# Patient Record
Sex: FEMALE | Race: OTHER | HISPANIC OR LATINO | Employment: UNEMPLOYED | URBAN - METROPOLITAN AREA
[De-identification: names, ages, dates, MRNs, and addresses within clinical notes are randomized per-mention and may not be internally consistent; named-entity substitution may affect disease eponyms.]

---

## 2022-12-07 ENCOUNTER — NON-PROVIDER VISIT (OUTPATIENT)
Dept: NEUROLOGY | Facility: MEDICAL CENTER | Age: 42
End: 2022-12-07
Attending: PSYCHIATRY & NEUROLOGY
Payer: MEDICAID

## 2022-12-07 DIAGNOSIS — R20.2 PARESTHESIA OF SKIN: ICD-10-CM

## 2022-12-07 PROCEDURE — 95909 NRV CNDJ TST 5-6 STUDIES: CPT | Mod: 26 | Performed by: PSYCHIATRY & NEUROLOGY

## 2022-12-07 PROCEDURE — 95886 MUSC TEST DONE W/N TEST COMP: CPT | Performed by: PSYCHIATRY & NEUROLOGY

## 2022-12-07 PROCEDURE — 95886 MUSC TEST DONE W/N TEST COMP: CPT | Mod: 26 | Performed by: PSYCHIATRY & NEUROLOGY

## 2022-12-07 PROCEDURE — 95909 NRV CNDJ TST 5-6 STUDIES: CPT | Performed by: PSYCHIATRY & NEUROLOGY

## 2022-12-07 NOTE — PROCEDURES
"NERVE CONDUCTION STUDIES AND ELECTROMYOGRAPHY REPORT  [facility]  12/07/22          IMPRESSION:  This is a normal study.  There is no evidence of a length dependent large fiber peripheral neuropathy or left lumbosacral radiculopathy.      Nereyda Simons MD  Neurology - Neurophysiology              REASON FOR REFERRAL:  Ms. Rowan Rockwell 41 y.o. referred by Helen CHRISTIE for an evaluation of sensory disturbance.  Symptoms are described as a warm sensation in the inner thighs with gradual distal spread.  Symptoms were present intermittently since July 2022 but within the last month have become more prominent and persistent.  She does have low back pain.  Symptoms originally started on the left.    Height: 5'0\"  Weight: 118 lbs    Symptom focused neurological exam shows normal strength and sensation to light touch in the bilateral lower extremities.  Patellar and Achilles reflexes are present and symmetric.      ELECTRODIAGNOSTIC EXAMINATION:  Nerve conduction studies (NCS) and electromyography (EMG) are utilized to evaluate direct or indirect damage to the peripheral nervous system. NCS are performed to measure the nerve(s) response(s) to electrostimulation across a given nerve segment. EMG evaluates the passive and active electrical activity of the muscle(s) in question.  Muscles are innervated by specific peripheral nerves and roots. Often times, several nerves the muscle to be examined in order to determine the presence or absence of the disease process. Furthermore, nerves and muscles may need to be tested in a ikth-mg-dphh comparison, as well as in additional extremities, as this may be crucial in characterizing the extent of the disease process, which may be diffuse or isolated and of varying degree of severity. The extent of the neurodiagnostic exam is justified as it may help arrive to a proper diagnosis, which ultimately may contribute to better management of the patient. Therefore, " the nerves to muscles examined during the study were medically necessary.    Unless otherwise noted, temperature of the extremity(s) study was monitored before and during the examination and remained between 32 and 36 degrees C for the upper extremities, and between 30 and 36 degrees C for the lower extremities. The patient tolerated testing well, without any complications.       NERVE CONDUCTION STUDY SUMMARY:  Selected nerves of the bilateral upper extremity are studied.    Normal bilateral sural sensory responses.  Normal bilateral common peroneal motor responses at the EDB.  Normal bilateral tibial motor responses at the AH.      NEEDLE EMG SUMMARY:  Concentric needle study of selected left lower extremity muscles is performed.     Insertion activity is normal in all muscles sampled.   With activation, there are normal morphology (amplitude/duration) motor unit action potentials firing with normal recruitment in muscles tested.       PATIENT DATA TABLES  Nerve Conduction Studies     Stim Site NR Onset (ms) Norm Onset (ms) O-P Amp (µV) Norm O-P Amp Site1 Site2 Delta-P (ms) Dist (cm) Keshav (m/s) Norm Keshav (m/s)   Left Sural Anti Sensory (Lat Mall)   Calf    1.9 <4.5 38.4 >5 Calf Lat Mall 2.7 14.0 52 >40   Right Sural Anti Sensory (Lat Mall)   Calf    2.5 <4.5 41.1 >5 Calf Lat Mall 3.3 14.0 42 >40        Stim Site NR Onset (ms) Norm Onset (ms) O-P Amp (mV) Norm O-P Amp Site1 Site2 Delta-0 (ms) Dist (cm) Keshav (m/s) Norm Keshav (m/s)   Left Peroneal EDB Motor (Ext Dig Brev)   Ankle    3.5 <5.5 5.3 >3.0 B Fib Ankle 6.0 32.0 53 >40   B Fib    9.5  4.9  Poplt B Fib 1.4 10.0 71    Poplt    10.9  4.7          Right Peroneal EDB Motor (Ext Dig Brev)   Ankle    4.2 <5.5 7.8 >3.0 B Fib Ankle 6.3 32.0 51 >40   B Fib    10.5  7.5  Poplt B Fib 1.1 10.0 91    Poplt    11.6  6.9          Left Tibial Motor (Abd Varela Brev)   Ankle    3.8 <6 11.7 >8 Knee Ankle 5.9 36.0 61 >40   Knee    9.7  10.4          Right Tibial Motor (Abd Varela Brev)    Ankle    3.8 <6 13.1 >8 Knee Ankle 6.4 37.0 58 >40   Knee    10.2  13.2                          Electromyography     Side Muscle Nerve Root Ins Act Fibs Psw Amp Dur Poly Recrt Int Pat Comment   Left AntTibialis Dp Br Fibular L4-5 Nml Nml Nml Nml Nml 0 Nml Nml    Left Gastroc Tibial S1-2 Nml Nml Nml Nml Nml 0 Nml Nml    Left VastusLat Femoral L2-4 Nml Nml Nml Nml Nml 0 Nml Nml    Left GluteusMed SupGluteal L5-S1 Nml Nml Nml Nml Nml 0 Nml Nml    Left Lumbo Parasp Low Rami L5-S1 Nml Nml Nml

## 2022-12-15 ENCOUNTER — HOSPITAL ENCOUNTER (OUTPATIENT)
Facility: MEDICAL CENTER | Age: 42
End: 2022-12-15
Attending: SPECIALIST
Payer: MEDICAID

## 2022-12-15 LAB
APPEARANCE UR: CLEAR
BACTERIA #/AREA URNS HPF: ABNORMAL /HPF
BILIRUB UR QL STRIP.AUTO: NEGATIVE
COLOR UR: ABNORMAL
EPI CELLS #/AREA URNS HPF: ABNORMAL /HPF
GLUCOSE UR STRIP.AUTO-MCNC: NEGATIVE MG/DL
HYALINE CASTS #/AREA URNS LPF: ABNORMAL /LPF
KETONES UR STRIP.AUTO-MCNC: NEGATIVE MG/DL
LEUKOCYTE ESTERASE UR QL STRIP.AUTO: ABNORMAL
MICRO URNS: ABNORMAL
NITRITE UR QL STRIP.AUTO: NEGATIVE
PH UR STRIP.AUTO: 7.5 [PH] (ref 5–8)
PROT UR QL STRIP: 30 MG/DL
RBC # URNS HPF: ABNORMAL /HPF
RBC UR QL AUTO: ABNORMAL
SP GR UR STRIP.AUTO: 1.01
UROBILINOGEN UR STRIP.AUTO-MCNC: 0.2 MG/DL
WBC #/AREA URNS HPF: ABNORMAL /HPF

## 2022-12-15 PROCEDURE — 81001 URINALYSIS AUTO W/SCOPE: CPT

## 2022-12-23 ENCOUNTER — HOSPITAL ENCOUNTER (OUTPATIENT)
Facility: MEDICAL CENTER | Age: 42
End: 2022-12-23
Attending: SPECIALIST
Payer: MEDICAID

## 2022-12-23 LAB
APPEARANCE UR: CLEAR
BACTERIA #/AREA URNS HPF: ABNORMAL /HPF
BILIRUB UR QL STRIP.AUTO: NEGATIVE
COLOR UR: YELLOW
EPI CELLS #/AREA URNS HPF: ABNORMAL /HPF
GLUCOSE UR STRIP.AUTO-MCNC: NEGATIVE MG/DL
HYALINE CASTS #/AREA URNS LPF: ABNORMAL /LPF
KETONES UR STRIP.AUTO-MCNC: NEGATIVE MG/DL
LEUKOCYTE ESTERASE UR QL STRIP.AUTO: ABNORMAL
MICRO URNS: ABNORMAL
NITRITE UR QL STRIP.AUTO: NEGATIVE
PH UR STRIP.AUTO: 6 [PH] (ref 5–8)
PROT UR QL STRIP: NEGATIVE MG/DL
RBC # URNS HPF: ABNORMAL /HPF
RBC UR QL AUTO: ABNORMAL
SP GR UR STRIP.AUTO: 1
UROBILINOGEN UR STRIP.AUTO-MCNC: 0.2 MG/DL
WBC #/AREA URNS HPF: ABNORMAL /HPF

## 2022-12-23 PROCEDURE — 87077 CULTURE AEROBIC IDENTIFY: CPT

## 2022-12-23 PROCEDURE — 81001 URINALYSIS AUTO W/SCOPE: CPT

## 2022-12-23 PROCEDURE — 87186 SC STD MICRODIL/AGAR DIL: CPT

## 2022-12-23 PROCEDURE — 87086 URINE CULTURE/COLONY COUNT: CPT

## 2022-12-25 LAB
BACTERIA UR CULT: ABNORMAL
BACTERIA UR CULT: ABNORMAL
SIGNIFICANT IND 70042: ABNORMAL
SITE SITE: ABNORMAL
SOURCE SOURCE: ABNORMAL

## 2023-01-23 ENCOUNTER — HOSPITAL ENCOUNTER (OUTPATIENT)
Dept: LAB | Facility: MEDICAL CENTER | Age: 43
End: 2023-01-23
Attending: SPECIALIST
Payer: MEDICAID

## 2023-01-23 LAB
ANION GAP SERPL CALC-SCNC: 11 MMOL/L (ref 7–16)
BUN SERPL-MCNC: 6 MG/DL (ref 8–22)
CALCIUM SERPL-MCNC: 9.3 MG/DL (ref 8.4–10.2)
CHLORIDE SERPL-SCNC: 101 MMOL/L (ref 96–112)
CO2 SERPL-SCNC: 25 MMOL/L (ref 20–33)
CREAT SERPL-MCNC: 0.54 MG/DL (ref 0.5–1.4)
GFR SERPLBLD CREATININE-BSD FMLA CKD-EPI: 117 ML/MIN/1.73 M 2
GLUCOSE SERPL-MCNC: 88 MG/DL (ref 65–99)
POTASSIUM SERPL-SCNC: 4.1 MMOL/L (ref 3.6–5.5)
SODIUM SERPL-SCNC: 137 MMOL/L (ref 135–145)

## 2023-01-23 PROCEDURE — 80048 BASIC METABOLIC PNL TOTAL CA: CPT

## 2023-01-23 PROCEDURE — 36415 COLL VENOUS BLD VENIPUNCTURE: CPT

## 2023-08-28 ENCOUNTER — HOSPITAL ENCOUNTER (OUTPATIENT)
Dept: RADIOLOGY | Facility: MEDICAL CENTER | Age: 43
End: 2023-08-28
Attending: NEUROLOGICAL SURGERY
Payer: MEDICAID

## 2023-08-28 DIAGNOSIS — M48.9 SPONDYLOPATHY: ICD-10-CM

## 2023-08-28 DIAGNOSIS — M54.12 RADICULOPATHY OF CERVICAL SPINE: ICD-10-CM

## 2023-08-28 PROCEDURE — 72156 MRI NECK SPINE W/O & W/DYE: CPT

## 2023-08-28 PROCEDURE — 700117 HCHG RX CONTRAST REV CODE 255: Mod: UD | Performed by: NEUROLOGICAL SURGERY

## 2023-08-28 PROCEDURE — 72157 MRI CHEST SPINE W/O & W/DYE: CPT

## 2023-08-28 PROCEDURE — A9579 GAD-BASE MR CONTRAST NOS,1ML: HCPCS | Mod: UD | Performed by: NEUROLOGICAL SURGERY

## 2023-08-28 RX ADMIN — GADOTERIDOL 12 ML: 279.3 INJECTION, SOLUTION INTRAVENOUS at 11:14

## 2023-09-06 ENCOUNTER — APPOINTMENT (OUTPATIENT)
Dept: ADMISSIONS | Facility: MEDICAL CENTER | Age: 43
DRG: 519 | End: 2023-09-06
Attending: NEUROLOGICAL SURGERY
Payer: MEDICAID

## 2023-09-19 ENCOUNTER — PRE-ADMISSION TESTING (OUTPATIENT)
Dept: ADMISSIONS | Facility: MEDICAL CENTER | Age: 43
DRG: 519 | End: 2023-09-19
Attending: NEUROLOGICAL SURGERY
Payer: MEDICAID

## 2023-09-19 RX ORDER — OMEGA-3 FATTY ACIDS/FISH OIL 300-1000MG
200 CAPSULE ORAL
COMMUNITY

## 2023-09-19 RX ORDER — ACETAMINOPHEN 325 MG/1
325 TABLET ORAL EVERY 4 HOURS PRN
COMMUNITY

## 2023-09-19 RX ORDER — NAPROXEN SODIUM 220 MG
220 TABLET ORAL
COMMUNITY

## 2023-09-20 ENCOUNTER — HOSPITAL ENCOUNTER (OUTPATIENT)
Dept: RADIOLOGY | Facility: MEDICAL CENTER | Age: 43
DRG: 519 | End: 2023-09-20
Attending: NEUROLOGICAL SURGERY | Admitting: NEUROLOGICAL SURGERY
Payer: MEDICAID

## 2023-09-20 ENCOUNTER — PRE-ADMISSION TESTING (OUTPATIENT)
Dept: ADMISSIONS | Facility: MEDICAL CENTER | Age: 43
DRG: 519 | End: 2023-09-20
Attending: NEUROLOGICAL SURGERY
Payer: MEDICAID

## 2023-09-20 DIAGNOSIS — Z01.812 PRE-PROCEDURAL LABORATORY EXAMINATION: ICD-10-CM

## 2023-09-20 DIAGNOSIS — Z01.810 PRE-PROCEDURAL CARDIOVASCULAR EXAMINATION: ICD-10-CM

## 2023-09-20 DIAGNOSIS — Z01.811 PRE-PROCEDURAL RESPIRATORY EXAMINATION: ICD-10-CM

## 2023-09-20 LAB
ABO GROUP BLD: NORMAL
ANION GAP SERPL CALC-SCNC: 11 MMOL/L (ref 7–16)
APPEARANCE UR: CLEAR
APTT PPP: 29.2 SEC (ref 24.7–36)
BASOPHILS # BLD AUTO: 1 % (ref 0–1.8)
BASOPHILS # BLD: 0.06 K/UL (ref 0–0.12)
BILIRUB UR QL STRIP.AUTO: NEGATIVE
BLD GP AB SCN SERPL QL: NORMAL
BUN SERPL-MCNC: 9 MG/DL (ref 8–22)
CALCIUM SERPL-MCNC: 9.4 MG/DL (ref 8.5–10.5)
CHLORIDE SERPL-SCNC: 102 MMOL/L (ref 96–112)
CO2 SERPL-SCNC: 25 MMOL/L (ref 20–33)
COLOR UR: YELLOW
CREAT SERPL-MCNC: 0.64 MG/DL (ref 0.5–1.4)
EKG IMPRESSION: NORMAL
EOSINOPHIL # BLD AUTO: 0.02 K/UL (ref 0–0.51)
EOSINOPHIL NFR BLD: 0.3 % (ref 0–6.9)
ERYTHROCYTE [DISTWIDTH] IN BLOOD BY AUTOMATED COUNT: 45.3 FL (ref 35.9–50)
GFR SERPLBLD CREATININE-BSD FMLA CKD-EPI: 112 ML/MIN/1.73 M 2
GLUCOSE SERPL-MCNC: 99 MG/DL (ref 65–99)
GLUCOSE UR STRIP.AUTO-MCNC: NEGATIVE MG/DL
HCT VFR BLD AUTO: 43.3 % (ref 37–47)
HGB BLD-MCNC: 13.7 G/DL (ref 12–16)
IMM GRANULOCYTES # BLD AUTO: 0.02 K/UL (ref 0–0.11)
IMM GRANULOCYTES NFR BLD AUTO: 0.3 % (ref 0–0.9)
INR PPP: 0.97 (ref 0.87–1.13)
KETONES UR STRIP.AUTO-MCNC: NEGATIVE MG/DL
LEUKOCYTE ESTERASE UR QL STRIP.AUTO: NEGATIVE
LYMPHOCYTES # BLD AUTO: 1.48 K/UL (ref 1–4.8)
LYMPHOCYTES NFR BLD: 25.4 % (ref 22–41)
MCH RBC QN AUTO: 28.4 PG (ref 27–33)
MCHC RBC AUTO-ENTMCNC: 31.6 G/DL (ref 32.2–35.5)
MCV RBC AUTO: 89.8 FL (ref 81.4–97.8)
MICRO URNS: NORMAL
MONOCYTES # BLD AUTO: 0.41 K/UL (ref 0–0.85)
MONOCYTES NFR BLD AUTO: 7 % (ref 0–13.4)
NEUTROPHILS # BLD AUTO: 3.84 K/UL (ref 1.82–7.42)
NEUTROPHILS NFR BLD: 66 % (ref 44–72)
NITRITE UR QL STRIP.AUTO: NEGATIVE
NRBC # BLD AUTO: 0 K/UL
NRBC BLD-RTO: 0 /100 WBC (ref 0–0.2)
PH UR STRIP.AUTO: 7 [PH] (ref 5–8)
PLATELET # BLD AUTO: 351 K/UL (ref 164–446)
PMV BLD AUTO: 10 FL (ref 9–12.9)
POTASSIUM SERPL-SCNC: 4.4 MMOL/L (ref 3.6–5.5)
PROT UR QL STRIP: NEGATIVE MG/DL
PROTHROMBIN TIME: 13 SEC (ref 12–14.6)
RBC # BLD AUTO: 4.82 M/UL (ref 4.2–5.4)
RBC UR QL AUTO: NEGATIVE
RH BLD: NORMAL
SODIUM SERPL-SCNC: 138 MMOL/L (ref 135–145)
SP GR UR STRIP.AUTO: 1
UROBILINOGEN UR STRIP.AUTO-MCNC: 0.2 MG/DL
WBC # BLD AUTO: 5.8 K/UL (ref 4.8–10.8)

## 2023-09-20 PROCEDURE — 81003 URINALYSIS AUTO W/O SCOPE: CPT

## 2023-09-20 PROCEDURE — 93005 ELECTROCARDIOGRAM TRACING: CPT

## 2023-09-20 PROCEDURE — 80048 BASIC METABOLIC PNL TOTAL CA: CPT

## 2023-09-20 PROCEDURE — 86900 BLOOD TYPING SEROLOGIC ABO: CPT

## 2023-09-20 PROCEDURE — 71046 X-RAY EXAM CHEST 2 VIEWS: CPT

## 2023-09-20 PROCEDURE — 85730 THROMBOPLASTIN TIME PARTIAL: CPT

## 2023-09-20 PROCEDURE — 86901 BLOOD TYPING SEROLOGIC RH(D): CPT

## 2023-09-20 PROCEDURE — 85610 PROTHROMBIN TIME: CPT

## 2023-09-20 PROCEDURE — 93010 ELECTROCARDIOGRAM REPORT: CPT | Performed by: INTERNAL MEDICINE

## 2023-09-20 PROCEDURE — 86850 RBC ANTIBODY SCREEN: CPT

## 2023-09-20 PROCEDURE — 36415 COLL VENOUS BLD VENIPUNCTURE: CPT

## 2023-09-20 PROCEDURE — 85025 COMPLETE CBC W/AUTO DIFF WBC: CPT

## 2023-09-26 ENCOUNTER — APPOINTMENT (OUTPATIENT)
Dept: RADIOLOGY | Facility: MEDICAL CENTER | Age: 43
DRG: 519 | End: 2023-09-26
Attending: NEUROLOGICAL SURGERY
Payer: MEDICAID

## 2023-09-26 ENCOUNTER — ANESTHESIA (OUTPATIENT)
Dept: SURGERY | Facility: MEDICAL CENTER | Age: 43
DRG: 519 | End: 2023-09-26
Payer: MEDICAID

## 2023-09-26 ENCOUNTER — HOSPITAL ENCOUNTER (INPATIENT)
Facility: MEDICAL CENTER | Age: 43
LOS: 4 days | DRG: 519 | End: 2023-09-30
Attending: NEUROLOGICAL SURGERY | Admitting: NEUROLOGICAL SURGERY
Payer: MEDICAID

## 2023-09-26 ENCOUNTER — ANESTHESIA EVENT (OUTPATIENT)
Dept: SURGERY | Facility: MEDICAL CENTER | Age: 43
DRG: 519 | End: 2023-09-26
Payer: MEDICAID

## 2023-09-26 DIAGNOSIS — G89.18 POSTOPERATIVE PAIN: ICD-10-CM

## 2023-09-26 LAB
ABO + RH BLD: NORMAL
PATHOLOGY CONSULT NOTE: NORMAL

## 2023-09-26 PROCEDURE — 36415 COLL VENOUS BLD VENIPUNCTURE: CPT

## 2023-09-26 PROCEDURE — 700111 HCHG RX REV CODE 636 W/ 250 OVERRIDE (IP): Performed by: STUDENT IN AN ORGANIZED HEALTH CARE EDUCATION/TRAINING PROGRAM

## 2023-09-26 PROCEDURE — 700102 HCHG RX REV CODE 250 W/ 637 OVERRIDE(OP): Performed by: STUDENT IN AN ORGANIZED HEALTH CARE EDUCATION/TRAINING PROGRAM

## 2023-09-26 PROCEDURE — 160041 HCHG SURGERY MINUTES - EA ADDL 1 MIN LEVEL 4: Performed by: NEUROLOGICAL SURGERY

## 2023-09-26 PROCEDURE — 95940 IONM IN OPERATNG ROOM 15 MIN: CPT | Performed by: NEUROLOGICAL SURGERY

## 2023-09-26 PROCEDURE — 700101 HCHG RX REV CODE 250: Performed by: STUDENT IN AN ORGANIZED HEALTH CARE EDUCATION/TRAINING PROGRAM

## 2023-09-26 PROCEDURE — 160029 HCHG SURGERY MINUTES - 1ST 30 MINS LEVEL 4: Performed by: NEUROLOGICAL SURGERY

## 2023-09-26 PROCEDURE — 88342 IMHCHEM/IMCYTCHM 1ST ANTB: CPT

## 2023-09-26 PROCEDURE — 700105 HCHG RX REV CODE 258

## 2023-09-26 PROCEDURE — 770001 HCHG ROOM/CARE - MED/SURG/GYN PRIV*

## 2023-09-26 PROCEDURE — 160035 HCHG PACU - 1ST 60 MINS PHASE I: Performed by: NEUROLOGICAL SURGERY

## 2023-09-26 PROCEDURE — 700105 HCHG RX REV CODE 258: Performed by: STUDENT IN AN ORGANIZED HEALTH CARE EDUCATION/TRAINING PROGRAM

## 2023-09-26 PROCEDURE — 700105 HCHG RX REV CODE 258: Performed by: NEUROLOGICAL SURGERY

## 2023-09-26 PROCEDURE — 160048 HCHG OR STATISTICAL LEVEL 1-5: Performed by: NEUROLOGICAL SURGERY

## 2023-09-26 PROCEDURE — 700111 HCHG RX REV CODE 636 W/ 250 OVERRIDE (IP)

## 2023-09-26 PROCEDURE — 160036 HCHG PACU - EA ADDL 30 MINS PHASE I: Performed by: NEUROLOGICAL SURGERY

## 2023-09-26 PROCEDURE — 95938 SOMATOSENSORY TESTING: CPT | Performed by: NEUROLOGICAL SURGERY

## 2023-09-26 PROCEDURE — 95939 C MOTOR EVOKED UPR&LWR LIMBS: CPT | Performed by: NEUROLOGICAL SURGERY

## 2023-09-26 PROCEDURE — 110454 HCHG SHELL REV 250: Performed by: NEUROLOGICAL SURGERY

## 2023-09-26 PROCEDURE — 00BT0ZZ EXCISION OF SPINAL MENINGES, OPEN APPROACH: ICD-10-PCS | Performed by: NEUROLOGICAL SURGERY

## 2023-09-26 PROCEDURE — 72020 X-RAY EXAM OF SPINE 1 VIEW: CPT

## 2023-09-26 PROCEDURE — 110371 HCHG SHELL REV 272: Performed by: NEUROLOGICAL SURGERY

## 2023-09-26 PROCEDURE — 95864 NEEDLE EMG 4 EXTREMITIES: CPT | Performed by: NEUROLOGICAL SURGERY

## 2023-09-26 PROCEDURE — A9270 NON-COVERED ITEM OR SERVICE: HCPCS | Performed by: STUDENT IN AN ORGANIZED HEALTH CARE EDUCATION/TRAINING PROGRAM

## 2023-09-26 PROCEDURE — 88307 TISSUE EXAM BY PATHOLOGIST: CPT

## 2023-09-26 PROCEDURE — 160009 HCHG ANES TIME/MIN: Performed by: NEUROLOGICAL SURGERY

## 2023-09-26 PROCEDURE — 95937 NEUROMUSCULAR JUNCTION TEST: CPT | Performed by: NEUROLOGICAL SURGERY

## 2023-09-26 PROCEDURE — 160002 HCHG RECOVERY MINUTES (STAT): Performed by: NEUROLOGICAL SURGERY

## 2023-09-26 PROCEDURE — 700101 HCHG RX REV CODE 250: Performed by: NEUROLOGICAL SURGERY

## 2023-09-26 PROCEDURE — 88331 PATH CONSLTJ SURG 1 BLK 1SPC: CPT

## 2023-09-26 PROCEDURE — 700111 HCHG RX REV CODE 636 W/ 250 OVERRIDE (IP): Mod: JZ | Performed by: NEUROLOGICAL SURGERY

## 2023-09-26 RX ORDER — CEFAZOLIN SODIUM 1 G/3ML
INJECTION, POWDER, FOR SOLUTION INTRAMUSCULAR; INTRAVENOUS
Status: DISCONTINUED | OUTPATIENT
Start: 2023-09-26 | End: 2023-09-26 | Stop reason: HOSPADM

## 2023-09-26 RX ORDER — ONDANSETRON 2 MG/ML
4 INJECTION INTRAMUSCULAR; INTRAVENOUS EVERY 4 HOURS PRN
Status: DISCONTINUED | OUTPATIENT
Start: 2023-09-26 | End: 2023-09-30 | Stop reason: HOSPADM

## 2023-09-26 RX ORDER — METHOCARBAMOL 750 MG/1
750 TABLET, FILM COATED ORAL EVERY 8 HOURS PRN
Status: DISCONTINUED | OUTPATIENT
Start: 2023-09-26 | End: 2023-09-30 | Stop reason: HOSPADM

## 2023-09-26 RX ORDER — PROMETHAZINE HYDROCHLORIDE 25 MG/1
12.5-25 TABLET ORAL EVERY 4 HOURS PRN
Status: DISCONTINUED | OUTPATIENT
Start: 2023-09-26 | End: 2023-09-30 | Stop reason: HOSPADM

## 2023-09-26 RX ORDER — LIDOCAINE HYDROCHLORIDE 20 MG/ML
INJECTION, SOLUTION EPIDURAL; INFILTRATION; INTRACAUDAL; PERINEURAL PRN
Status: DISCONTINUED | OUTPATIENT
Start: 2023-09-26 | End: 2023-09-26 | Stop reason: SURG

## 2023-09-26 RX ORDER — PROMETHAZINE HYDROCHLORIDE 25 MG/1
12.5-25 SUPPOSITORY RECTAL EVERY 4 HOURS PRN
Status: DISCONTINUED | OUTPATIENT
Start: 2023-09-26 | End: 2023-09-30 | Stop reason: HOSPADM

## 2023-09-26 RX ORDER — ENEMA 19; 7 G/133ML; G/133ML
1 ENEMA RECTAL
Status: DISCONTINUED | OUTPATIENT
Start: 2023-09-26 | End: 2023-09-30 | Stop reason: HOSPADM

## 2023-09-26 RX ORDER — DEXAMETHASONE SODIUM PHOSPHATE 4 MG/ML
INJECTION, SOLUTION INTRA-ARTICULAR; INTRALESIONAL; INTRAMUSCULAR; INTRAVENOUS; SOFT TISSUE PRN
Status: DISCONTINUED | OUTPATIENT
Start: 2023-09-26 | End: 2023-09-26 | Stop reason: SURG

## 2023-09-26 RX ORDER — ENOXAPARIN SODIUM 100 MG/ML
40 INJECTION SUBCUTANEOUS
Status: DISCONTINUED | OUTPATIENT
Start: 2023-09-27 | End: 2023-09-30 | Stop reason: HOSPADM

## 2023-09-26 RX ORDER — ACETAMINOPHEN 500 MG
1000 TABLET ORAL ONCE
Status: COMPLETED | OUTPATIENT
Start: 2023-09-26 | End: 2023-09-26

## 2023-09-26 RX ORDER — DOCUSATE SODIUM 100 MG/1
100 CAPSULE, LIQUID FILLED ORAL 2 TIMES DAILY
Status: DISCONTINUED | OUTPATIENT
Start: 2023-09-26 | End: 2023-09-30 | Stop reason: HOSPADM

## 2023-09-26 RX ORDER — HYDROMORPHONE HYDROCHLORIDE 1 MG/ML
0.5 INJECTION, SOLUTION INTRAMUSCULAR; INTRAVENOUS; SUBCUTANEOUS
Status: DISCONTINUED | OUTPATIENT
Start: 2023-09-26 | End: 2023-09-26 | Stop reason: HOSPADM

## 2023-09-26 RX ORDER — AMOXICILLIN 250 MG
1 CAPSULE ORAL
Status: DISCONTINUED | OUTPATIENT
Start: 2023-09-26 | End: 2023-09-30 | Stop reason: HOSPADM

## 2023-09-26 RX ORDER — BUPIVACAINE HYDROCHLORIDE AND EPINEPHRINE 5; 5 MG/ML; UG/ML
INJECTION, SOLUTION PERINEURAL
Status: DISCONTINUED | OUTPATIENT
Start: 2023-09-26 | End: 2023-09-26 | Stop reason: HOSPADM

## 2023-09-26 RX ORDER — DIPHENHYDRAMINE HCL 25 MG
25 TABLET ORAL EVERY 6 HOURS PRN
Status: DISCONTINUED | OUTPATIENT
Start: 2023-09-26 | End: 2023-09-30 | Stop reason: HOSPADM

## 2023-09-26 RX ORDER — OXYCODONE HYDROCHLORIDE AND ACETAMINOPHEN 5; 325 MG/1; MG/1
1 TABLET ORAL EVERY 4 HOURS PRN
Status: DISCONTINUED | OUTPATIENT
Start: 2023-09-26 | End: 2023-09-30 | Stop reason: HOSPADM

## 2023-09-26 RX ORDER — POLYETHYLENE GLYCOL 3350 17 G/17G
1 POWDER, FOR SOLUTION ORAL 2 TIMES DAILY PRN
Status: DISCONTINUED | OUTPATIENT
Start: 2023-09-26 | End: 2023-09-30 | Stop reason: HOSPADM

## 2023-09-26 RX ORDER — CEFAZOLIN SODIUM 1 G/3ML
INJECTION, POWDER, FOR SOLUTION INTRAMUSCULAR; INTRAVENOUS PRN
Status: DISCONTINUED | OUTPATIENT
Start: 2023-09-26 | End: 2023-09-26 | Stop reason: SURG

## 2023-09-26 RX ORDER — SUCCINYLCHOLINE CHLORIDE 20 MG/ML
INJECTION INTRAMUSCULAR; INTRAVENOUS PRN
Status: DISCONTINUED | OUTPATIENT
Start: 2023-09-26 | End: 2023-09-26 | Stop reason: SURG

## 2023-09-26 RX ORDER — HYDROMORPHONE HYDROCHLORIDE 1 MG/ML
0.2 INJECTION, SOLUTION INTRAMUSCULAR; INTRAVENOUS; SUBCUTANEOUS
Status: DISCONTINUED | OUTPATIENT
Start: 2023-09-26 | End: 2023-09-26 | Stop reason: HOSPADM

## 2023-09-26 RX ORDER — ACETAMINOPHEN 500 MG
500 TABLET ORAL EVERY 6 HOURS PRN
Status: DISCONTINUED | OUTPATIENT
Start: 2023-09-26 | End: 2023-09-30 | Stop reason: HOSPADM

## 2023-09-26 RX ORDER — HALOPERIDOL 5 MG/ML
1 INJECTION INTRAMUSCULAR
Status: DISCONTINUED | OUTPATIENT
Start: 2023-09-26 | End: 2023-09-26 | Stop reason: HOSPADM

## 2023-09-26 RX ORDER — SODIUM CHLORIDE, SODIUM LACTATE, POTASSIUM CHLORIDE, CALCIUM CHLORIDE 600; 310; 30; 20 MG/100ML; MG/100ML; MG/100ML; MG/100ML
INJECTION, SOLUTION INTRAVENOUS CONTINUOUS
Status: DISCONTINUED | OUTPATIENT
Start: 2023-09-26 | End: 2023-09-30 | Stop reason: HOSPADM

## 2023-09-26 RX ORDER — OXYCODONE HCL 5 MG/5 ML
10 SOLUTION, ORAL ORAL
Status: COMPLETED | OUTPATIENT
Start: 2023-09-26 | End: 2023-09-26

## 2023-09-26 RX ORDER — HYDROCODONE BITARTRATE AND ACETAMINOPHEN 10; 325 MG/1; MG/1
1 TABLET ORAL EVERY 4 HOURS PRN
Status: DISCONTINUED | OUTPATIENT
Start: 2023-09-26 | End: 2023-09-30 | Stop reason: HOSPADM

## 2023-09-26 RX ORDER — ACETAMINOPHEN 10 MG/ML
1 INJECTION, SOLUTION INTRAVENOUS ONCE
Status: DISCONTINUED | OUTPATIENT
Start: 2023-09-26 | End: 2023-09-26

## 2023-09-26 RX ORDER — OXYCODONE HCL 10 MG/1
10 TABLET, FILM COATED, EXTENDED RELEASE ORAL ONCE
Status: COMPLETED | OUTPATIENT
Start: 2023-09-26 | End: 2023-09-26

## 2023-09-26 RX ORDER — PROCHLORPERAZINE EDISYLATE 5 MG/ML
5-10 INJECTION INTRAMUSCULAR; INTRAVENOUS EVERY 4 HOURS PRN
Status: DISCONTINUED | OUTPATIENT
Start: 2023-09-26 | End: 2023-09-30 | Stop reason: HOSPADM

## 2023-09-26 RX ORDER — SODIUM CHLORIDE, SODIUM LACTATE, POTASSIUM CHLORIDE, CALCIUM CHLORIDE 600; 310; 30; 20 MG/100ML; MG/100ML; MG/100ML; MG/100ML
INJECTION, SOLUTION INTRAVENOUS CONTINUOUS
Status: ACTIVE | OUTPATIENT
Start: 2023-09-26 | End: 2023-09-26

## 2023-09-26 RX ORDER — ONDANSETRON 2 MG/ML
4 INJECTION INTRAMUSCULAR; INTRAVENOUS
Status: DISCONTINUED | OUTPATIENT
Start: 2023-09-26 | End: 2023-09-26 | Stop reason: HOSPADM

## 2023-09-26 RX ORDER — DIPHENHYDRAMINE HYDROCHLORIDE 50 MG/ML
12.5 INJECTION INTRAMUSCULAR; INTRAVENOUS
Status: DISCONTINUED | OUTPATIENT
Start: 2023-09-26 | End: 2023-09-26 | Stop reason: HOSPADM

## 2023-09-26 RX ORDER — HYDROMORPHONE HYDROCHLORIDE 1 MG/ML
0.1 INJECTION, SOLUTION INTRAMUSCULAR; INTRAVENOUS; SUBCUTANEOUS
Status: DISCONTINUED | OUTPATIENT
Start: 2023-09-26 | End: 2023-09-26 | Stop reason: HOSPADM

## 2023-09-26 RX ORDER — EPHEDRINE SULFATE 50 MG/ML
5 INJECTION, SOLUTION INTRAVENOUS
Status: DISCONTINUED | OUTPATIENT
Start: 2023-09-26 | End: 2023-09-26 | Stop reason: HOSPADM

## 2023-09-26 RX ORDER — BISACODYL 10 MG
10 SUPPOSITORY, RECTAL RECTAL
Status: DISCONTINUED | OUTPATIENT
Start: 2023-09-26 | End: 2023-09-30 | Stop reason: HOSPADM

## 2023-09-26 RX ORDER — SODIUM CHLORIDE, SODIUM LACTATE, POTASSIUM CHLORIDE, CALCIUM CHLORIDE 600; 310; 30; 20 MG/100ML; MG/100ML; MG/100ML; MG/100ML
INJECTION, SOLUTION INTRAVENOUS CONTINUOUS
Status: CANCELLED | OUTPATIENT
Start: 2023-09-26 | End: 2023-09-26

## 2023-09-26 RX ORDER — OXYCODONE HCL 5 MG/5 ML
5 SOLUTION, ORAL ORAL
Status: COMPLETED | OUTPATIENT
Start: 2023-09-26 | End: 2023-09-26

## 2023-09-26 RX ORDER — ONDANSETRON 2 MG/ML
INJECTION INTRAMUSCULAR; INTRAVENOUS PRN
Status: DISCONTINUED | OUTPATIENT
Start: 2023-09-26 | End: 2023-09-26 | Stop reason: SURG

## 2023-09-26 RX ORDER — HYDRALAZINE HYDROCHLORIDE 20 MG/ML
5 INJECTION INTRAMUSCULAR; INTRAVENOUS
Status: DISCONTINUED | OUTPATIENT
Start: 2023-09-26 | End: 2023-09-26 | Stop reason: HOSPADM

## 2023-09-26 RX ORDER — ONDANSETRON 4 MG/1
4 TABLET, ORALLY DISINTEGRATING ORAL EVERY 4 HOURS PRN
Status: DISCONTINUED | OUTPATIENT
Start: 2023-09-26 | End: 2023-09-30 | Stop reason: HOSPADM

## 2023-09-26 RX ORDER — LABETALOL HYDROCHLORIDE 5 MG/ML
10 INJECTION, SOLUTION INTRAVENOUS
Status: DISCONTINUED | OUTPATIENT
Start: 2023-09-26 | End: 2023-09-30 | Stop reason: HOSPADM

## 2023-09-26 RX ORDER — HYDROMORPHONE HYDROCHLORIDE 1 MG/ML
0.5 INJECTION, SOLUTION INTRAMUSCULAR; INTRAVENOUS; SUBCUTANEOUS
Status: DISCONTINUED | OUTPATIENT
Start: 2023-09-26 | End: 2023-09-30 | Stop reason: HOSPADM

## 2023-09-26 RX ORDER — SODIUM CHLORIDE, SODIUM LACTATE, POTASSIUM CHLORIDE, CALCIUM CHLORIDE 600; 310; 30; 20 MG/100ML; MG/100ML; MG/100ML; MG/100ML
INJECTION, SOLUTION INTRAVENOUS CONTINUOUS
Status: DISCONTINUED | OUTPATIENT
Start: 2023-09-26 | End: 2023-09-26 | Stop reason: HOSPADM

## 2023-09-26 RX ORDER — AMOXICILLIN 250 MG
1 CAPSULE ORAL NIGHTLY
Status: DISCONTINUED | OUTPATIENT
Start: 2023-09-26 | End: 2023-09-30 | Stop reason: HOSPADM

## 2023-09-26 RX ORDER — MEPERIDINE HYDROCHLORIDE 25 MG/ML
6.25 INJECTION INTRAMUSCULAR; INTRAVENOUS; SUBCUTANEOUS
Status: DISCONTINUED | OUTPATIENT
Start: 2023-09-26 | End: 2023-09-26 | Stop reason: HOSPADM

## 2023-09-26 RX ORDER — DIPHENHYDRAMINE HYDROCHLORIDE 50 MG/ML
25 INJECTION INTRAMUSCULAR; INTRAVENOUS EVERY 6 HOURS PRN
Status: DISCONTINUED | OUTPATIENT
Start: 2023-09-26 | End: 2023-09-30 | Stop reason: HOSPADM

## 2023-09-26 RX ORDER — HYDROMORPHONE HYDROCHLORIDE 2 MG/ML
INJECTION, SOLUTION INTRAMUSCULAR; INTRAVENOUS; SUBCUTANEOUS PRN
Status: DISCONTINUED | OUTPATIENT
Start: 2023-09-26 | End: 2023-09-26 | Stop reason: SURG

## 2023-09-26 RX ORDER — CALCIUM CARBONATE 500 MG/1
500 TABLET, CHEWABLE ORAL 2 TIMES DAILY
Status: DISCONTINUED | OUTPATIENT
Start: 2023-09-26 | End: 2023-09-30 | Stop reason: HOSPADM

## 2023-09-26 RX ADMIN — OXYCODONE AND ACETAMINOPHEN 1 TABLET: 5; 325 TABLET ORAL at 20:26

## 2023-09-26 RX ADMIN — ONDANSETRON 4 MG: 2 INJECTION INTRAMUSCULAR; INTRAVENOUS at 11:36

## 2023-09-26 RX ADMIN — MIDAZOLAM 2 MG: 1 INJECTION, SOLUTION INTRAMUSCULAR; INTRAVENOUS at 08:05

## 2023-09-26 RX ADMIN — OXYCODONE HYDROCHLORIDE 10 MG: 10 TABLET, FILM COATED, EXTENDED RELEASE ORAL at 07:05

## 2023-09-26 RX ADMIN — PROPOFOL 100 MG: 10 INJECTION, EMULSION INTRAVENOUS at 08:39

## 2023-09-26 RX ADMIN — METHOCARBAMOL 1000 MG: 100 INJECTION, SOLUTION INTRAMUSCULAR; INTRAVENOUS at 13:07

## 2023-09-26 RX ADMIN — DOCUSATE SODIUM 100 MG: 100 CAPSULE, LIQUID FILLED ORAL at 18:10

## 2023-09-26 RX ADMIN — METHOCARBAMOL 1000 MG: 100 INJECTION, SOLUTION INTRAMUSCULAR; INTRAVENOUS at 23:15

## 2023-09-26 RX ADMIN — OXYCODONE HYDROCHLORIDE 10 MG: 5 SOLUTION ORAL at 12:38

## 2023-09-26 RX ADMIN — CEFAZOLIN 2 G: 1 INJECTION, POWDER, FOR SOLUTION INTRAMUSCULAR; INTRAVENOUS at 08:33

## 2023-09-26 RX ADMIN — CEFAZOLIN 2 G: 2 INJECTION, POWDER, FOR SOLUTION INTRAMUSCULAR; INTRAVENOUS at 23:13

## 2023-09-26 RX ADMIN — OXYCODONE AND ACETAMINOPHEN 1 TABLET: 5; 325 TABLET ORAL at 15:26

## 2023-09-26 RX ADMIN — ROCURONIUM BROMIDE 10 MG: 10 INJECTION, SOLUTION INTRAVENOUS at 08:15

## 2023-09-26 RX ADMIN — HYDROMORPHONE HYDROCHLORIDE 0.5 MG: 1 INJECTION, SOLUTION INTRAMUSCULAR; INTRAVENOUS; SUBCUTANEOUS at 18:08

## 2023-09-26 RX ADMIN — CEFAZOLIN 2 G: 2 INJECTION, POWDER, FOR SOLUTION INTRAMUSCULAR; INTRAVENOUS at 15:37

## 2023-09-26 RX ADMIN — METHOCARBAMOL 750 MG: 750 TABLET ORAL at 20:28

## 2023-09-26 RX ADMIN — LIDOCAINE HYDROCHLORIDE 80 MG: 20 INJECTION, SOLUTION EPIDURAL; INFILTRATION; INTRACAUDAL at 08:15

## 2023-09-26 RX ADMIN — ANTACID TABLETS 500 MG: 500 TABLET, CHEWABLE ORAL at 18:10

## 2023-09-26 RX ADMIN — HYDROMORPHONE HYDROCHLORIDE 1 MG: 2 INJECTION INTRAMUSCULAR; INTRAVENOUS; SUBCUTANEOUS at 08:05

## 2023-09-26 RX ADMIN — ROCURONIUM BROMIDE 20 MG: 10 INJECTION, SOLUTION INTRAVENOUS at 09:03

## 2023-09-26 RX ADMIN — SUCCINYLCHOLINE CHLORIDE 60 MG: 20 INJECTION, SOLUTION INTRAMUSCULAR; INTRAVENOUS at 08:15

## 2023-09-26 RX ADMIN — SODIUM CHLORIDE, POTASSIUM CHLORIDE, SODIUM LACTATE AND CALCIUM CHLORIDE: 600; 310; 30; 20 INJECTION, SOLUTION INTRAVENOUS at 07:03

## 2023-09-26 RX ADMIN — SODIUM CHLORIDE, POTASSIUM CHLORIDE, SODIUM LACTATE AND CALCIUM CHLORIDE: 600; 310; 30; 20 INJECTION, SOLUTION INTRAVENOUS at 15:32

## 2023-09-26 RX ADMIN — PROPOFOL 100 MG: 10 INJECTION, EMULSION INTRAVENOUS at 08:15

## 2023-09-26 RX ADMIN — ACETAMINOPHEN 1000 MG: 500 TABLET, FILM COATED ORAL at 07:05

## 2023-09-26 RX ADMIN — DEXAMETHASONE SODIUM PHOSPHATE 10 MG: 4 INJECTION INTRA-ARTICULAR; INTRALESIONAL; INTRAMUSCULAR; INTRAVENOUS; SOFT TISSUE at 08:46

## 2023-09-26 RX ADMIN — SODIUM CHLORIDE 40 MCG/MIN: 900 INJECTION INTRAVENOUS at 08:40

## 2023-09-26 RX ADMIN — REMIFENTANIL HYDROCHLORIDE 0.05 MCG/KG/MIN: 1 INJECTION, POWDER, LYOPHILIZED, FOR SOLUTION INTRAVENOUS at 08:40

## 2023-09-26 RX ADMIN — PROPOFOL 125 MCG/KG/MIN: 10 INJECTION, EMULSION INTRAVENOUS at 08:40

## 2023-09-26 RX ADMIN — SENNOSIDES AND DOCUSATE SODIUM 1 TABLET: 50; 8.6 TABLET ORAL at 20:28

## 2023-09-26 ASSESSMENT — PAIN SCALES - PAIN ASSESSMENT IN ADVANCED DEMENTIA (PAINAD)
CONSOLABILITY: NO NEED TO CONSOLE
BREATHING: NORMAL
BODYLANGUAGE: RELAXED
TOTALSCORE: 0
FACIALEXPRESSION: SMILING OR INEXPRESSIVE
FACIALEXPRESSION: SMILING OR INEXPRESSIVE
BREATHING: NORMAL
CONSOLABILITY: NO NEED TO CONSOLE
FACIALEXPRESSION: SMILING OR INEXPRESSIVE
BODYLANGUAGE: RELAXED
CONSOLABILITY: NO NEED TO CONSOLE
BODYLANGUAGE: RELAXED
BREATHING: NORMAL
TOTALSCORE: 0
TOTALSCORE: 0

## 2023-09-26 ASSESSMENT — PAIN SCALES - GENERAL: PAIN_LEVEL: 0

## 2023-09-26 ASSESSMENT — PAIN DESCRIPTION - PAIN TYPE
TYPE: ACUTE PAIN
TYPE: ACUTE PAIN;REFERRED PAIN
TYPE: SURGICAL PAIN
TYPE: SURGICAL PAIN
TYPE: ACUTE PAIN
TYPE: ACUTE PAIN
TYPE: SURGICAL PAIN
TYPE: ACUTE PAIN
TYPE: ACUTE PAIN

## 2023-09-26 ASSESSMENT — PAIN SCALES - WONG BAKER
WONGBAKER_NUMERICALRESPONSE: HURTS EVEN MORE
WONGBAKER_NUMERICALRESPONSE: DOESN'T HURT AT ALL
WONGBAKER_NUMERICALRESPONSE: HURTS A WHOLE LOT
WONGBAKER_NUMERICALRESPONSE: DOESN'T HURT AT ALL
WONGBAKER_NUMERICALRESPONSE: HURTS EVEN MORE

## 2023-09-26 NOTE — OP REPORT
DATE OF SERVICE:  09/26/2023     PREOPERATIVE DIAGNOSES:  1.  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  2.  Thoracic myelopathy.  3.  Severe spinal cord compression.     POSTOPERATIVE DIAGNOSES:  1.  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  2.  Thoracic myelopathy.  3.  Severe spinal cord compression.     PROCEDURES PERFORMED:  1.  T2-T5 laminectomies.  2.  Resection of intradural extramedullary tumor.  3.  Use of intraoperative microscope.  4.  Use of intraoperative monitoring, improved throughout the case.     SURGEON:  Renato Santiago MD     ASSISTANT:  Princess Ngo PA-C     ANESTHESIOLOGIST:  Darrell Caballero MD     ANESTHESIA:  General endotracheal anesthesia.     COMPLICATIONS:  None.     FINDINGS:  Gross total resection of tumor with improvement of motor evoked   potentials.     ESTIMATED BLOOD LOSS:  50 mL.     INDICATIONS FOR PROCEDURE:  The patient is a 42-year-old female who presented   to my outpatient clinic with worsening lower extremity function and   paresthesias.  Imaging demonstrated a large contrast enhancing intradural   extramedullary mass from T3 through T5.  Given the symptomatology and   compression of the spinal cord, surgical intervention was indicated.  Risks   and benefits were thoroughly discussed with the patient.  Risks include   bleeding, infection, CSF leak, need for additional surgery, postoperative   hematoma.  Additionally, there is a risk of damage to neural elements that   could include worsening neurological function, motor paralysis, sensory   changes, bowel or bladder incontinence, loss of sexual function.    Additionally, she is [employer] and has denied consent for blood   products.  We are aware of this and okay proceeding.  The patient understands   the risks as well and wants to proceed with surgery.     DESCRIPTION OF THE SURGERY:  Informed consent was obtained from the patient.    The patient was brought to the operating theater and induced  by anesthesia.    Neuromonitoring was applied to all 4 extremities with strong signal in the   upper extremities.  On the lower extremities, MAPs were only present in the   left RF, RT, TA and bilateral AH.  The patient was carefully placed prone on a   Raul OSI table with all pressure points padded.  Repeat motors were   similar as pre-flip.  Using fluoroscopy, I identified the T3 through T5   vertebral bodies and marked on the patient's skin.  The patient was prepped   and draped in sterile fashion.  A timeout occurred.  Preoperative antibiotics   were given.  I infiltrated the incision with Marcaine and made a midline   incision from approximately T2-T5.  Bovie electrocautery to Bovie down to the   spinous process and then continued subperiosteal dissection down the lamina   bilaterally to expose the lamina to the level of the pars.  Fluoroscopy   verified that I was at the 4-5 disk space.     I then prepared for the laminectomy.  I bit off the spinous processes.  I then   drilled laminectomy troughs initially from T3 through T5 bilaterally.  I then   used an upgoing curette to remove the lamina off the undergoing ligamentum   flavum.  I widened out the laminectomy defect with Kerrison rongeurs and   interestingly on the right side, the tumor had pushed the nerve roots   posteriorly to where they were exposed above the laminectomy defect.  This was   carefully noted and the nerve roots were protected.  I used ultrasound probe   to verify the tumor and the rostral portion was not totally evident, so I used   a high-speed drill to drill off the caudal portion of the T2 lamina to expose   the rostral end of the tumor.  With the tumor exposed underneath the dura, I   brought in an intraoperative microscope for microscopic dissection, used a 15   blade to make a midline incision over the tumor and extended it caudally to   the caudal extent of the tumor and then rostrally to expose the rostral extent   of the  tumor.  The dura leaflets was reflected laterally and secured to the   muscle with a 4-0 Nurolon.  I then placed patties around the durotomy.  I then   began tumor resection.     I used a nerve hook to start removing the arachnoid over the tumor.  I then   obtained a plane on the left side and then as well on the rostral portion.  I   placed a small lyudmila in the rostral portion to maintain the tumor margin.  I   then worked my way caudally, and there was a considerable amount of   vasculature on the caudal portion of the tumor that was carefully dissected   and preserved.  There were 2 nerve rootlets that were draped over the tumor.    The one that was most rostral was dissectable.  I was able to obtain a plane   and carefully dissected off the underlying tumor mass.  The other nerve root   seemed to be grained in the tumor, so I identified the insertion the rostral   and caudal portion, isolated the nerve root, coagulated and then cut it.  At   this point, the tumor was rather mobile.  I took a chunk that was sent for   frozen pathology that returned as schwannoma.  I then carefully rotated the   tumor out towards the ventral aspect that was stuck on arachnoid.  I carefully   dissected this out with Rhoton dissectors and was able to remove the tumor   almost entirely en block.  This was sent for permanent pathology.  I irrigated   out the intradural space.  There was still a residual amount of tumor that   was attached on the left side.  This was carefully teased out with Rhoton   dissectors and sucked.     At this point, there was gross tumor resection.  I irrigated out the wound and   obtained hemostasis.  I inspected the resection bed and saw no residual   tumor.  I then cut the Nurolon suture that was reflecting the dural leaflets   back and used a 6-0 Nottingham-Ismael to close the dura in a running fashion.  With the   durotomy closed, I did a Valsalva maneuver to confirm watertight closure.  I   then removed the  patties and obtained final hemostasis over the dural space.    I placed a strip of Surgicel over the durotomy and then sealed it with   Tisseel.  Once the Tisseel was dried, I placed a Hemovac over the laminectomy   defect that was to be set to gravity.  I then closed the wound in layers with   muscle and fascia closed with 0 Vicryl, the dermis closed with 2-0 Vicryl, and   the skin closed with a running nylon suture.  There were no complications   noted.  Motor evoked potentials improved throughout the case where they were   present in bilateral APB, RF, TA, and AH.  SSEPs were stable during the case.    Sponge and needle counts were correct x2.  The patient was turned to   anesthesia for extubation.        ______________________________  MD KALA Melvin/RADHA    DD:  09/26/2023 11:44  DT:  09/26/2023 13:00    Job#:  [id number]

## 2023-09-26 NOTE — ANESTHESIA TIME REPORT
Anesthesia Start and Stop Event Times     Date Time Event    9/26/2023 0745 Ready for Procedure     0805 Anesthesia Start     1201 Anesthesia Stop        Responsible Staff  09/26/23    Name Role Begin End    Darrell Caballero M.D. Anesth 0805 1201        Overtime Reason:  no overtime (within assigned shift)    Comments:

## 2023-09-26 NOTE — ANESTHESIA POSTPROCEDURE EVALUATION
Patient: Rowan Rockwell    Procedure Summary     Date: 09/26/23 Room / Location: [facility] 06 / SURGERY [facility]    Anesthesia Start: 0805 Anesthesia Stop: 1201    Procedure: THORACIC 2-5 LAMINECTOMY FOR RESECTION OF INTRADURAL TUMOR (Spine Thoracic) Diagnosis: (BENIGN NEOPLASM OF SPINAL CORD)    Surgeons: Renato Santiago M.D. Responsible Provider: Darrell Caballero M.D.    Anesthesia Type: general ASA Status: 2          Final Anesthesia Type: general  Last vitals  BP   Blood Pressure: 125/67    Temp   36.3 °C (97.3 °F)    Pulse   (!) 111   Resp   12    SpO2   99 %      Anesthesia Post Evaluation    Patient location during evaluation: PACU  Patient participation: complete - patient participated  Level of consciousness: awake and alert  Pain score: 0    Airway patency: patent  Anesthetic complications: no  Cardiovascular status: hemodynamically stable  Respiratory status: acceptable  Hydration status: euvolemic    PONV: none          There were no known notable events for this encounter.     Nurse Pain Score: 0 (NPRS)

## 2023-09-26 NOTE — PROGRESS NOTES
4 Eyes Skin Assessment Completed by Christine RN and RONAL Hardy.    Head WDL  Ears WDL  Nose WDL  Mouth WDL  Neck WDL  Breast/Chest WDL  Shoulder Blades WDL  Spine Incision  (R) Arm/Elbow/Hand WDL  (L) Arm/Elbow/Hand WDL  Abdomen WDL  Groin WDL  Scrotum/Coccyx/Buttocks WDL  (R) Leg WDL  (L) Leg WDL  (R) Heel/Foot/Toe WDL  (L) Heel/Foot/Toe WDL          Devices In Places Blood Pressure Cuff, Pulse Ox, Chiang, and SCD's      Interventions In Place Waffle Overlay and Pressure Redistribution Mattress    Possible Skin Injury No    Pictures Uploaded Into Epic N/A  Wound Consult Placed N/A  RN Wound Prevention Protocol Ordered No

## 2023-09-26 NOTE — ANESTHESIA PREPROCEDURE EVALUATION
Case: [id number] Date/Time: 09/26/23 0745    Procedure: THORACIC 2-5 LAMINECTOMY FOR RESECTION OF INTRADURAL TUMOR    Pre-op diagnosis: BENIGN NEOPLASM OF SPINAL CORD    Location: [facility] OR 06 / SURGERY [facility]    Surgeons: Renato Santiago M.D.          Relevant Problems   No relevant active problems       Physical Exam    Airway   Mallampati: II  TM distance: >3 FB  Neck ROM: full       Cardiovascular - normal exam  Rhythm: regular  Rate: normal  (-) murmur     Dental - normal exam           Pulmonary - normal exam  Breath sounds clear to auscultation     Abdominal    Neurological - normal exam                 Anesthesia Plan    ASA 2       Plan - general       Airway plan will be ETT          Induction: intravenous    Postoperative Plan: Postoperative administration of opioids is intended.    Pertinent diagnostic labs and testing reviewed    Informed Consent:    Anesthetic plan and risks discussed with patient.    Use of blood products discussed with: patient whom did not consent to blood products.   Special considerations: Sabianist.

## 2023-09-26 NOTE — PROGRESS NOTES
Med Rec complete per PT  Allergies Reviewed    Pt reports NOT taking anticoagulant in the last 14 days

## 2023-09-26 NOTE — ANESTHESIA PROCEDURE NOTES
Arterial Line    Performed by: Darrell Caballero M.D.  Authorized by: Darrell Caballero M.D.    Start Time:  9/26/2023 8:21 AM  End Time:  9/26/2023 8:23 AM  Localization: surface landmarks    Patient Location:  OR  Indication: continuous blood pressure monitoring        Catheter Size:  20 G  Seldinger Technique?: Yes    Laterality:  Left  Site:  Radial artery  Line Secured:  Antimicrobial disc, tape and transparent dressing  Events: patient tolerated procedure well with no complications

## 2023-09-26 NOTE — ANESTHESIA PROCEDURE NOTES
Airway    Date/Time: 9/26/2023 8:15 AM    Performed by: Darrell Caballero M.D.  Authorized by: Darrell Caballero M.D.    Location:  OR  Urgency:  Elective  Indications for Airway Management:  Anesthesia      Spontaneous Ventilation: absent    Sedation Level:  Deep  Preoxygenated: Yes    Patient Position:  Sniffing  Mask Difficulty Assessment:  0 - not attempted  Final Airway Type:  Endotracheal airway  Final Endotracheal Airway:  ETT  Cuffed: Yes    Technique Used for Successful ETT Placement:  Direct laryngoscopy    Insertion Site:  Oral  Blade Type:  Donte  Laryngoscope Blade/Videolaryngoscope Blade Size:  3  ETT Size (mm):  7.0  Measured from:  Teeth  ETT to Teeth (cm):  20  Placement Verified by: auscultation and capnometry    Cormack-Lehane Classification:  Grade IIa - partial view of glottis  Number of Attempts at Approach:  1

## 2023-09-26 NOTE — OR SURGEON
Immediate Post OP Note    PreOp Diagnosis: Intradural tumor      PostOp Diagnosis: Same      Procedure(s):  THORACIC 2-5 LAMINECTOMY FOR RESECTION OF INTRADURAL TUMOR - Wound Class: Clean    Surgeon(s):  Renato Santiago M.D.    Anesthesiologist/Type of Anesthesia:  Anesthesiologist: Darrell Caballero M.D./General    Surgical Staff:  Assistant: Princess Ngo P.A.-C.  Circulator: KIRT Morris Circulator: KIRT Rios Scrub: Bailey Whitley  Scrub Person: Joey Dietz  Radiology Technologist: Yolanda Roy    Specimens removed if any:  ID Type Source Tests Collected by Time Destination   A : Intradural Tumor Other Other PATHOLOGY SPECIMEN Renato Santiago M.D. 9/26/2023 10:15 AM        Estimated Blood Loss: 50 cc    Findings: patient did well, see OP report.     Complications: none.         9/26/2023 11:55 AM Princess gNo P.A.-C.

## 2023-09-26 NOTE — OR NURSING
Patient arrived to PACU in stable condition. Dr Santiago to bedside. Patient is not to lay flat, and hemovac is not to be compressed. Medicated with oral pain medications with good pain control. She is only sore. SHELLEY and MADDISON. Belongings to bedside and  has been updated.

## 2023-09-27 PROCEDURE — A9270 NON-COVERED ITEM OR SERVICE: HCPCS | Performed by: STUDENT IN AN ORGANIZED HEALTH CARE EDUCATION/TRAINING PROGRAM

## 2023-09-27 PROCEDURE — 700111 HCHG RX REV CODE 636 W/ 250 OVERRIDE (IP): Performed by: STUDENT IN AN ORGANIZED HEALTH CARE EDUCATION/TRAINING PROGRAM

## 2023-09-27 PROCEDURE — 700105 HCHG RX REV CODE 258: Performed by: STUDENT IN AN ORGANIZED HEALTH CARE EDUCATION/TRAINING PROGRAM

## 2023-09-27 PROCEDURE — A9270 NON-COVERED ITEM OR SERVICE: HCPCS | Performed by: PHYSICIAN ASSISTANT

## 2023-09-27 PROCEDURE — 700102 HCHG RX REV CODE 250 W/ 637 OVERRIDE(OP): Performed by: PHYSICIAN ASSISTANT

## 2023-09-27 PROCEDURE — 700102 HCHG RX REV CODE 250 W/ 637 OVERRIDE(OP): Performed by: STUDENT IN AN ORGANIZED HEALTH CARE EDUCATION/TRAINING PROGRAM

## 2023-09-27 PROCEDURE — 770001 HCHG ROOM/CARE - MED/SURG/GYN PRIV*

## 2023-09-27 RX ORDER — BETHANECHOL CHLORIDE 10 MG/1
10 TABLET ORAL 3 TIMES DAILY
Status: DISCONTINUED | OUTPATIENT
Start: 2023-09-27 | End: 2023-09-30 | Stop reason: HOSPADM

## 2023-09-27 RX ADMIN — OXYCODONE AND ACETAMINOPHEN 1 TABLET: 5; 325 TABLET ORAL at 00:06

## 2023-09-27 RX ADMIN — CEFAZOLIN 2 G: 2 INJECTION, POWDER, FOR SOLUTION INTRAMUSCULAR; INTRAVENOUS at 05:10

## 2023-09-27 RX ADMIN — HYDROMORPHONE HYDROCHLORIDE 0.5 MG: 1 INJECTION, SOLUTION INTRAMUSCULAR; INTRAVENOUS; SUBCUTANEOUS at 18:25

## 2023-09-27 RX ADMIN — ANTACID TABLETS 500 MG: 500 TABLET, CHEWABLE ORAL at 04:59

## 2023-09-27 RX ADMIN — DOCUSATE SODIUM 100 MG: 100 CAPSULE, LIQUID FILLED ORAL at 04:59

## 2023-09-27 RX ADMIN — HYDROMORPHONE HYDROCHLORIDE 0.5 MG: 1 INJECTION, SOLUTION INTRAMUSCULAR; INTRAVENOUS; SUBCUTANEOUS at 21:25

## 2023-09-27 RX ADMIN — SENNOSIDES AND DOCUSATE SODIUM 1 TABLET: 50; 8.6 TABLET ORAL at 21:26

## 2023-09-27 RX ADMIN — METHOCARBAMOL 1000 MG: 100 INJECTION, SOLUTION INTRAMUSCULAR; INTRAVENOUS at 21:57

## 2023-09-27 RX ADMIN — HYDROMORPHONE HYDROCHLORIDE 0.5 MG: 1 INJECTION, SOLUTION INTRAMUSCULAR; INTRAVENOUS; SUBCUTANEOUS at 09:00

## 2023-09-27 RX ADMIN — BETHANECHOL CHLORIDE 10 MG: 10 TABLET ORAL at 18:36

## 2023-09-27 RX ADMIN — DOCUSATE SODIUM 100 MG: 100 CAPSULE, LIQUID FILLED ORAL at 18:25

## 2023-09-27 RX ADMIN — METHOCARBAMOL 1000 MG: 100 INJECTION, SOLUTION INTRAMUSCULAR; INTRAVENOUS at 15:24

## 2023-09-27 RX ADMIN — HYDROMORPHONE HYDROCHLORIDE 0.5 MG: 1 INJECTION, SOLUTION INTRAMUSCULAR; INTRAVENOUS; SUBCUTANEOUS at 15:18

## 2023-09-27 RX ADMIN — HYDROMORPHONE HYDROCHLORIDE 0.5 MG: 1 INJECTION, SOLUTION INTRAMUSCULAR; INTRAVENOUS; SUBCUTANEOUS at 12:28

## 2023-09-27 RX ADMIN — ANTACID TABLETS 500 MG: 500 TABLET, CHEWABLE ORAL at 18:25

## 2023-09-27 RX ADMIN — HYDROMORPHONE HYDROCHLORIDE 0.5 MG: 1 INJECTION, SOLUTION INTRAMUSCULAR; INTRAVENOUS; SUBCUTANEOUS at 04:46

## 2023-09-27 RX ADMIN — METHOCARBAMOL 1000 MG: 100 INJECTION, SOLUTION INTRAMUSCULAR; INTRAVENOUS at 04:48

## 2023-09-27 RX ADMIN — ENOXAPARIN SODIUM 40 MG: 100 INJECTION SUBCUTANEOUS at 05:09

## 2023-09-27 RX ADMIN — ACETAMINOPHEN 500 MG: 500 TABLET, FILM COATED ORAL at 07:11

## 2023-09-27 ASSESSMENT — PAIN DESCRIPTION - PAIN TYPE
TYPE: SURGICAL PAIN
TYPE: SURGICAL PAIN

## 2023-09-27 NOTE — PROGRESS NOTES
Neurosurgery Progress Note    Subjective:  POD#1 T2-5 laminectomies with intradural tumor resection.  Doing ok, reports mild HA that improves with medications.  States that pre-operative paresthesia is largely similar to prior to surgery, but has noticed some improvement in LE paresthesia.  Pathology pending still.   Drain with 40 cc output last 8 hours.     Exam:  Motor 5/5.  Dressing c/d/I..     BP  Min: 125/67  Max: 145/99  Pulse  Av.1  Min: 86  Max: 116  Resp  Avg: 15.7  Min: 10  Max: 20  Temp  Av.7 °C (98.1 °F)  Min: 36.3 °C (97.3 °F)  Max: 37.2 °C (99 °F)  SpO2  Av.4 %  Min: 92 %  Max: 99 %    No data recorded                      Intake/Output                         23 07 - 2359 23 - 23 0659      Total  Total                 Intake    I.V.  1000  -- 1000  --  -- --    Volume (mL) (lactated ringers infusion) 1000 -- 1000 -- -- --    Total Intake 1000 -- 1000 -- -- --       Output    Urine  750  3725 4475  --  -- --    Urine 750 -- 750 -- -- --    Output (mL) (Urethral Catheter Non-latex;Temperature probe 16 Fr.) -- 3725 3725 -- -- --    Drains  --  57 57  --  -- --    Output (mL) (Closed/Suction Drain 1 Superior Back Hemovac 10 Fr.) -- 57 57 -- -- --    Blood  50  -- 50  --  -- --    Est. Blood Loss 50 -- 50 -- -- --    Total Output 800 3782 4582 -- -- --       Net I/O     200 -3782 -3582 -- -- --              Intake/Output Summary (Last 24 hours) at 2023 1103  Last data filed at 2023 0630  Gross per 24 hour   Intake 1000 ml   Output 4582 ml   Net -3582 ml             Pharmacy Consult Request  1 Each PHARMACY TO DOSE    MD ALERT...DO NOT ADMINISTER NSAIDS or ASPIRIN unless ORDERED By Neurosurgery  1 Each PRN    docusate sodium  100 mg BID    senna-docusate  1 Tablet Nightly    senna-docusate  1 Tablet Q24HRS PRN    polyethylene glycol/lytes  1 Packet BID PRN    magnesium hydroxide  30 mL QDAY PRN    bisacodyl  10  mg Q24HRS PRN    sodium phosphate  1 Each Once PRN    LR   Continuous    enoxaparin (LOVENOX) injection  40 mg Daily-0600    acetaminophen  500 mg Q6HRS PRN    HYDROcodone/acetaminophen  1 Tablet Q4HRS PRN    oxyCODONE-acetaminophen  1 Tablet Q4HRS PRN    HYDROmorphone  0.5 mg Q3HRS PRN    methocarbamol (Robaxin) 1,000 mg in  mL IVPB  1,000 mg Q8HRS    diphenhydrAMINE  25 mg Q6HRS PRN    Or    diphenhydrAMINE  25 mg Q6HRS PRN    ondansetron  4 mg Q4HRS PRN    ondansetron  4 mg Q4HRS PRN    promethazine  12.5-25 mg Q4HRS PRN    promethazine  12.5-25 mg Q4HRS PRN    prochlorperazine  5-10 mg Q4HRS PRN    methocarbamol  750 mg Q8HRS PRN    labetalol  10 mg Q HOUR PRN    benzocaine-menthol  1 Lozenge Q2HRS PRN    calcium carbonate  500 mg BID       Assessment and Plan:  Hospital day # 2  POD# 1  Bedrest with HOB >30 degrees until 1200 tomorrow, and then may be OOB and ambulatory as tolerated after that.  Ok to get to bedside commode for BM now.  Ok to d/c drain if output <30 cc in 8 hours.  MRI thoracic spine with and without contrast scheduled for Friday.    Chemical prophylactic DVT therapy: Yes - Lovenox 40mg/qd    Start date/time: today.

## 2023-09-27 NOTE — THERAPY
Occupational Therapy Contact Note    Patient Name: Rowan Rockwell  Age:  42 y.o., Sex:  female  Medical Record #: [id number]  Today's Date: 9/27/2023 09/27/23 0843   Interdisciplinary Plan of Care Collaboration   Collaboration Comments OT order received. On bedrest until 9/28 at noon. Will initiate OT eval as appropriate once pt cleared for activity

## 2023-09-27 NOTE — THERAPY
Physical Therapy Contact Note    Patient Name: Rowan Rockwell  Age:  42 y.o., Sex:  female  Medical Record #: [id number]  Today's Date: 9/27/2023 09/27/23 0819   Treatment Variance   Reason For Missed Therapy Medical - Patient on Bedrest   Interdisciplinary Plan of Care Collaboration   Collaboration Comments PT orders received, chart reviewed. Pt on strict bedrest for 48 hours post op, until 9/28/23 at noon.   Session Information   Date / Session Number  9/27 bedrest until 9/28 noon (hold)

## 2023-09-27 NOTE — PROGRESS NOTES
Assumed care of patient. Bedside report received from night shift RN. Patient updated on plan of care. Patient reminded of bedrest restrictions and to call for assistance. Patient verbalized understanding. Call light is within reach and fall precautions are in place.

## 2023-09-27 NOTE — DISCHARGE PLANNING
Case Management Discharge Planning    Admission Date: 9/26/2023  GMLOS: 3.2  ALOS: 1    6-Clicks ADL Score:    6-Clicks Mobility Score:        Anticipated Discharge Dispo:  Pending medical team collaboration    DME Needed: No, unable to determine at this time.     Action(s) Taken: OTHER    Pt discussed in IDT rounds. Patient had surgery on 09/26 and sustained a dural tear. Therefore, patient will be on bed rest for the next 48 hours. Pending post-op PT/OT recommendations.     Plan of care ongoing. DC disposition pending.        Escalations Completed: None    Medically Clear: No    Next Steps: Care coordination will continue to follow and assist with discharge planning as needed.     Barriers to Discharge: Medical clearance and Pending PT Evaluation    Is the patient up for discharge tomorrow: No

## 2023-09-27 NOTE — CARE PLAN
Problem: Knowledge Deficit - Standard  Goal: Patient and family/care givers will demonstrate understanding of plan of care, disease process/condition, diagnostic tests and medications  Description: Target End Date:  1-3 days or as soon as patient condition allows    Document in Patient Education    1.  Patient and family/caregiver oriented to unit, equipment, visitation policy and means for communicating concern  2.  Complete/review Learning Assessment  3.  Assess knowledge level of disease process/condition, treatment plan, diagnostic tests and medications  4.  Explain disease process/condition, treatment plan, diagnostic tests and medications  Outcome: Met   The patient is Stable - Low risk of patient condition declining or worsening    Shift Goals  Clinical Goals: Bed rest, Chiang care,HOB at  more than 30 degrees, pain control  Patient Goals: Comfort, rest, pain control  Family Goals: Update with POC    Progress made toward(s) clinical / shift goals:      Pt. Vital signs were in normal range.   Pt. Verbalized understanding on the care provided.   Pt. Rated pain as 6 from the scale of 1-10, 10 being the most painful. Pain medications administered as ordered.

## 2023-09-28 PROCEDURE — 700105 HCHG RX REV CODE 258: Performed by: STUDENT IN AN ORGANIZED HEALTH CARE EDUCATION/TRAINING PROGRAM

## 2023-09-28 PROCEDURE — 700102 HCHG RX REV CODE 250 W/ 637 OVERRIDE(OP): Performed by: STUDENT IN AN ORGANIZED HEALTH CARE EDUCATION/TRAINING PROGRAM

## 2023-09-28 PROCEDURE — 97535 SELF CARE MNGMENT TRAINING: CPT

## 2023-09-28 PROCEDURE — A9270 NON-COVERED ITEM OR SERVICE: HCPCS | Performed by: PHYSICIAN ASSISTANT

## 2023-09-28 PROCEDURE — 97161 PT EVAL LOW COMPLEX 20 MIN: CPT

## 2023-09-28 PROCEDURE — 700111 HCHG RX REV CODE 636 W/ 250 OVERRIDE (IP): Mod: JZ | Performed by: STUDENT IN AN ORGANIZED HEALTH CARE EDUCATION/TRAINING PROGRAM

## 2023-09-28 PROCEDURE — 770001 HCHG ROOM/CARE - MED/SURG/GYN PRIV*

## 2023-09-28 PROCEDURE — 97165 OT EVAL LOW COMPLEX 30 MIN: CPT

## 2023-09-28 PROCEDURE — 700102 HCHG RX REV CODE 250 W/ 637 OVERRIDE(OP): Performed by: PHYSICIAN ASSISTANT

## 2023-09-28 PROCEDURE — A9270 NON-COVERED ITEM OR SERVICE: HCPCS | Performed by: STUDENT IN AN ORGANIZED HEALTH CARE EDUCATION/TRAINING PROGRAM

## 2023-09-28 RX ORDER — DIAZEPAM 5 MG/1
5 TABLET ORAL ONCE
Status: ACTIVE | OUTPATIENT
Start: 2023-09-28 | End: 2023-09-29

## 2023-09-28 RX ADMIN — DOCUSATE SODIUM 100 MG: 100 CAPSULE, LIQUID FILLED ORAL at 18:05

## 2023-09-28 RX ADMIN — ANTACID TABLETS 500 MG: 500 TABLET, CHEWABLE ORAL at 18:04

## 2023-09-28 RX ADMIN — BETHANECHOL CHLORIDE 10 MG: 10 TABLET ORAL at 12:16

## 2023-09-28 RX ADMIN — HYDROCODONE BITARTRATE AND ACETAMINOPHEN 1 TABLET: 10; 325 TABLET ORAL at 09:50

## 2023-09-28 RX ADMIN — HYDROMORPHONE HYDROCHLORIDE 0.5 MG: 1 INJECTION, SOLUTION INTRAMUSCULAR; INTRAVENOUS; SUBCUTANEOUS at 00:26

## 2023-09-28 RX ADMIN — HYDROCODONE BITARTRATE AND ACETAMINOPHEN 1 TABLET: 10; 325 TABLET ORAL at 23:31

## 2023-09-28 RX ADMIN — BETHANECHOL CHLORIDE 10 MG: 10 TABLET ORAL at 05:46

## 2023-09-28 RX ADMIN — HYDROCODONE BITARTRATE AND ACETAMINOPHEN 1 TABLET: 10; 325 TABLET ORAL at 19:31

## 2023-09-28 RX ADMIN — HYDROCODONE BITARTRATE AND ACETAMINOPHEN 1 TABLET: 10; 325 TABLET ORAL at 14:47

## 2023-09-28 RX ADMIN — DOCUSATE SODIUM 100 MG: 100 CAPSULE, LIQUID FILLED ORAL at 05:46

## 2023-09-28 RX ADMIN — HYDROMORPHONE HYDROCHLORIDE 0.5 MG: 1 INJECTION, SOLUTION INTRAMUSCULAR; INTRAVENOUS; SUBCUTANEOUS at 03:29

## 2023-09-28 RX ADMIN — HYDROMORPHONE HYDROCHLORIDE 0.5 MG: 1 INJECTION, SOLUTION INTRAMUSCULAR; INTRAVENOUS; SUBCUTANEOUS at 06:34

## 2023-09-28 RX ADMIN — SENNOSIDES AND DOCUSATE SODIUM 1 TABLET: 50; 8.6 TABLET ORAL at 20:48

## 2023-09-28 RX ADMIN — BETHANECHOL CHLORIDE 10 MG: 10 TABLET ORAL at 18:05

## 2023-09-28 RX ADMIN — ENOXAPARIN SODIUM 40 MG: 100 INJECTION SUBCUTANEOUS at 05:46

## 2023-09-28 RX ADMIN — ANTACID TABLETS 500 MG: 500 TABLET, CHEWABLE ORAL at 05:46

## 2023-09-28 RX ADMIN — METHOCARBAMOL 750 MG: 750 TABLET ORAL at 20:48

## 2023-09-28 RX ADMIN — METHOCARBAMOL 750 MG: 750 TABLET ORAL at 12:15

## 2023-09-28 RX ADMIN — METHOCARBAMOL 1000 MG: 100 INJECTION, SOLUTION INTRAMUSCULAR; INTRAVENOUS at 05:49

## 2023-09-28 ASSESSMENT — PAIN DESCRIPTION - PAIN TYPE
TYPE: SURGICAL PAIN

## 2023-09-28 ASSESSMENT — COGNITIVE AND FUNCTIONAL STATUS - GENERAL
MOBILITY SCORE: 23
SUGGESTED CMS G CODE MODIFIER DAILY ACTIVITY: CI
SUGGESTED CMS G CODE MODIFIER MOBILITY: CI
CLIMB 3 TO 5 STEPS WITH RAILING: A LITTLE
HELP NEEDED FOR BATHING: A LITTLE
DAILY ACTIVITIY SCORE: 23

## 2023-09-28 ASSESSMENT — GAIT ASSESSMENTS
GAIT LEVEL OF ASSIST: STANDBY ASSIST
DISTANCE (FEET): 150
DEVIATION: OTHER (COMMENT)

## 2023-09-28 ASSESSMENT — ACTIVITIES OF DAILY LIVING (ADL): TOILETING: INDEPENDENT

## 2023-09-28 NOTE — THERAPY
Occupational Therapy   Initial Evaluation     Patient Name: Rowan Rockwell  Age:  42 y.o., Sex:  female  Medical Record #: [id number]  Today's Date: 9/28/2023     Precautions  Precautions: Spinal / Back Precautions     Assessment  Patient is 42 y.o. female s/p T2-5 laminectomies with intradural tumor resection was seen for OT eval. Pt, family and roommate were present during eval. Pt required Magalis for bed mobility and CGA for functional transfers and ambulation in room. Pt was able to complete g/h and toileting with SPV, and demonstrated the ability to complete LB dressing while seated, all ADLs performed while following spinal precautions. Pt educated on spinal precautions during ADLs, transfers, and functional activities, and OT demo'd tub transfer, pt and roommate verbalized understanding of all education. Pt has great family support and roommate to help with needs at home once home. Anticipate that the patient will have no further occupational therapy needs after discharge from the hospital.   Plan    DC Equipment Recommendations: None  Discharge Recommendations: Anticipate that the patient will have no further occupational therapy needs after discharge from the hospital     Subjective    Pt, sisters, Mom, and roommate were present during evaluation. Pt reported L buttock numbness prior, during, and post activity, RN notified.       Objective       09/28/23 1407   Charge Group   OT Evaluation OT Evaluation Low   OT Self Care / ADL (Units) 1   Total Time Spent   OT Time Spent Yes   OT Self Care / ADL (Minutes) 15   OT Evaluation (Minutes) 15   OT Total Time Spent (Calculated) 30   Initial Contact Note    Initial Contact Note Order Received and Verified, Evaluation Only - Patient Does Not Require Further Acute Occupational Therapy at this Time.  However, May Benefit from Post Acute Therapy for Higher Level Functional Deficits.   Prior Living Situation   Prior Services None   Housing / Facility 1 [facility]  Into Home 0   Bathroom Set up Bathtub / Shower Combination   Equipment Owned None   Lives with - Patient's Self Care Capacity Unrelated Adult   Comments Pt lives with roommate   Prior Level of ADL Function   Self Feeding Independent   Grooming / Hygiene Independent   Bathing Independent   Dressing Independent   Toileting Independent   Prior Level of IADL Function   Medication Management Independent   Laundry Independent   Kitchen Mobility Independent   Finances Independent   Home Management Independent   Shopping Independent   Prior Level Of Mobility Independent Without Device in Community;Independent With Device in Home   Driving / Transportation Driving Independent   Precautions   Precautions Spinal / Back Precautions    Vitals   O2 Delivery Device None - Room Air   Pain   Pain Scales 0 to 10 Scale    Intervention Ambulation / Increased Activity   Pain 0 - 10 Group   Location Back   Location Orientation Upper   Pain Rating Scale (NPRS) 2   Description Aching;Sore   Therapist Pain Assessment Post Activity Pain Same as Prior to Activity;Nurse Notified  (pain at incision site, L buttocks numb prior, during, and post activity)   Cognition    Cognition / Consciousness WDL   Level of Consciousness Alert   Comments Pt very pleasant and motivated   Passive ROM Upper Body   Passive ROM Upper Body Not Tested   Active ROM Upper Body   Active ROM Upper Body  Not Tested   Strength Upper Body   Upper Body Strength  WDL   Sensation Upper Body   Upper Extremity Sensation  WDL   Upper Body Muscle Tone   Upper Body Muscle Tone  WDL   Balance Assessment   Sitting Balance (Static) Fair   Sitting Balance (Dynamic) Fair   Standing Balance (Static) Fair   Standing Balance (Dynamic) Fair   Weight Shift Sitting Fair   Weight Shift Standing Fair   Bed Mobility    Supine to Sit Minimal Assist  (log roll)   Rolling Minimal Assist to Rt.  (log roll)   ADL Assessment   Grooming Supervision;Standing   Lower Body Dressing Supervision  (used  figure 4 position while seated)   Toileting Supervision   How much help from another person does the patient currently need...   Putting on and taking off regular lower body clothing? 4   Bathing (including washing, rinsing, and drying)? 3   Toileting, which includes using a toilet, bedpan, or urinal? 4   Putting on and taking off regular upper body clothing? 4   Taking care of personal grooming such as brushing teeth? 4   Eating meals? 4   6 Clicks Daily Activity Score 23   Functional Mobility   Sit to Stand Standby Assist   Bed, Chair, Wheelchair Transfer Standby Assist   Toilet Transfers Standby Assist   Mobility supine <> EOB, sit <> stand to ambulate to bathroom for toileting and g/h,, back to bed   Visual Perception   Visual Perception  WDL   Activity Tolerance   Sitting Edge of Bed 5   Standing 10   Comments Pt demonstrated good activity tolerance to complete g/h standing at sink and walking in room to toilet   Education Group   Education Provided Spinal Precautions;Role of Occupational Therapist;Activities of Daily Living   Role of Occupational Therapist Patient Response Patient;Other;Acceptance;Explanation;Verbal Demonstration   Spinal Precautions Patient Response Patient;Other;Explanation;Demonstration;Verbal Demonstration;Action Demonstration   ADL Patient Response Patient;Other;Acceptance;Explanation;Demonstration;Verbal Demonstration;Action Demonstration   Additional Comments Pt's family and roommate was present and participated in education with pt.  (Pt educated on spinal precautions during ADLs, transfers, and functional activities. OT demo'd tub transfer.)   Anticipated Discharge Equipment and Recommendations   DC Equipment Recommendations None   Discharge Recommendations Anticipate that the patient will have no further occupational therapy needs after discharge from the hospital   Interdisciplinary Plan of Care Collaboration   IDT Collaboration with  Nursing   Patient Position at End of Therapy In  Bed;Call Light within Reach;Tray Table within Reach;Family / Friend in Room   Collaboration Comments RN updated   Session Information   Date / Session Number  9/28 OT eval only

## 2023-09-28 NOTE — PROGRESS NOTES
Neurosurgery Progress Note    Subjective:  POD#2 T2-5 laminectomies with intradural tumor resection.  Doing ok, reports mild HA that improves with medications.  States that pre-operative paresthesia is largely similar to prior to surgery, but has noticed some improvement in LE paresthesia.  Pathology pending still.   Drain with 35 cc output last 8 hours.     Exam:  Motor 5/5.  Dressing c/d/I..     BP  Min: 130/77  Max: 144/91  Pulse  Av.3  Min: 98  Max: 108  Resp  Av  Min: 16  Max: 16  Temp  Av.7 °C (98.1 °F)  Min: 36.7 °C (98.1 °F)  Max: 36.8 °C (98.2 °F)  SpO2  Av.3 %  Min: 93 %  Max: 96 %    No data recorded                      Intake/Output                         23 0700 - 23 0659 23 0700 - 23 0659     7595-1047 0882-2820 Total 7799-1448 9800-3862 Total                 Intake    Total Intake -- -- -- -- -- --       Output    Urine  1550  1050 2600  --  -- --    Output (mL) (Urethral Catheter Non-latex;Temperature probe 16 Fr.) 1550 1050 2600 -- -- --    Drains  40  115 155  --  -- --    Output (mL) (Closed/Suction Drain 1 Superior Back Hemovac 10 Fr.) 40 115 155 -- -- --    Total Output 1590 1165 2755 -- -- --       Net I/O     -1590 -1165 -2755 -- -- --              Intake/Output Summary (Last 24 hours) at 2023 1123  Last data filed at 2023 0408  Gross per 24 hour   Intake --   Output 2755 ml   Net -2755 ml               bethanechol  10 mg TID    Pharmacy Consult Request  1 Each PHARMACY TO DOSE    MD ALERT...DO NOT ADMINISTER NSAIDS or ASPIRIN unless ORDERED By Neurosurgery  1 Each PRN    docusate sodium  100 mg BID    senna-docusate  1 Tablet Nightly    senna-docusate  1 Tablet Q24HRS PRN    polyethylene glycol/lytes  1 Packet BID PRN    magnesium hydroxide  30 mL QDAY PRN    bisacodyl  10 mg Q24HRS PRN    sodium phosphate  1 Each Once PRN    LR   Continuous    enoxaparin (LOVENOX) injection  40 mg Daily-0600    acetaminophen  500 mg Q6HRS PRN     HYDROcodone/acetaminophen  1 Tablet Q4HRS PRN    oxyCODONE-acetaminophen  1 Tablet Q4HRS PRN    HYDROmorphone  0.5 mg Q3HRS PRN    diphenhydrAMINE  25 mg Q6HRS PRN    Or    diphenhydrAMINE  25 mg Q6HRS PRN    ondansetron  4 mg Q4HRS PRN    ondansetron  4 mg Q4HRS PRN    promethazine  12.5-25 mg Q4HRS PRN    promethazine  12.5-25 mg Q4HRS PRN    prochlorperazine  5-10 mg Q4HRS PRN    methocarbamol  750 mg Q8HRS PRN    labetalol  10 mg Q HOUR PRN    benzocaine-menthol  1 Lozenge Q2HRS PRN    calcium carbonate  500 mg BID       Assessment and Plan:  Hospital day # 3  POD# 2  Bedrest with HOB >30 degrees until 1200 today, and then may be OOB and ambulatory as tolerated after that.  D/c joshua when ambulatory.   Ok to d/c drain if output <30 cc in 8 hours.  MRI thoracic spine with and without contrast scheduled for Friday.    Chemical prophylactic DVT therapy: Yes - Lovenox 40mg/qd    Start date/time: today.

## 2023-09-28 NOTE — CARE PLAN
The patient is Stable - Low risk of patient condition declining or worsening    Shift Goals  Clinical Goals: d/c bed rest, joshua, and drain at 1200, pain control, mobility/safety  Patient Goals: pain control, tolerate OOB  Family Goals: not present    Progress made toward(s) clinical / shift goals:      Problem: Pain - Standard  Goal: Alleviation of pain or a reduction in pain to the patient’s comfort goal  Outcome: Progressing  Note: Medications administered per MAR, pillows for comfort and repositioning. Pt educated on multimodal pain treatments and possible side effects.         Patient is not progressing towards the following goals:

## 2023-09-28 NOTE — PROGRESS NOTES
Chiang and hemovac removed per orders. Pt tolerated well, educated on post void residual bladder scans.

## 2023-09-29 ENCOUNTER — APPOINTMENT (OUTPATIENT)
Dept: RADIOLOGY | Facility: MEDICAL CENTER | Age: 43
DRG: 519 | End: 2023-09-29
Attending: STUDENT IN AN ORGANIZED HEALTH CARE EDUCATION/TRAINING PROGRAM
Payer: MEDICAID

## 2023-09-29 PROCEDURE — 700117 HCHG RX CONTRAST REV CODE 255: Performed by: STUDENT IN AN ORGANIZED HEALTH CARE EDUCATION/TRAINING PROGRAM

## 2023-09-29 PROCEDURE — 72157 MRI CHEST SPINE W/O & W/DYE: CPT

## 2023-09-29 PROCEDURE — 700102 HCHG RX REV CODE 250 W/ 637 OVERRIDE(OP): Performed by: STUDENT IN AN ORGANIZED HEALTH CARE EDUCATION/TRAINING PROGRAM

## 2023-09-29 PROCEDURE — 700111 HCHG RX REV CODE 636 W/ 250 OVERRIDE (IP): Performed by: STUDENT IN AN ORGANIZED HEALTH CARE EDUCATION/TRAINING PROGRAM

## 2023-09-29 PROCEDURE — A9579 GAD-BASE MR CONTRAST NOS,1ML: HCPCS | Performed by: STUDENT IN AN ORGANIZED HEALTH CARE EDUCATION/TRAINING PROGRAM

## 2023-09-29 PROCEDURE — 700102 HCHG RX REV CODE 250 W/ 637 OVERRIDE(OP): Performed by: PHYSICIAN ASSISTANT

## 2023-09-29 PROCEDURE — 770001 HCHG ROOM/CARE - MED/SURG/GYN PRIV*

## 2023-09-29 PROCEDURE — A9270 NON-COVERED ITEM OR SERVICE: HCPCS | Performed by: PHYSICIAN ASSISTANT

## 2023-09-29 PROCEDURE — A9270 NON-COVERED ITEM OR SERVICE: HCPCS | Performed by: STUDENT IN AN ORGANIZED HEALTH CARE EDUCATION/TRAINING PROGRAM

## 2023-09-29 RX ORDER — DIAZEPAM 5 MG/1
5 TABLET ORAL
Status: COMPLETED | OUTPATIENT
Start: 2023-09-29 | End: 2023-09-29

## 2023-09-29 RX ADMIN — GADOTERIDOL 10 ML: 279.3 INJECTION, SOLUTION INTRAVENOUS at 23:20

## 2023-09-29 RX ADMIN — HYDROMORPHONE HYDROCHLORIDE 0.5 MG: 1 INJECTION, SOLUTION INTRAMUSCULAR; INTRAVENOUS; SUBCUTANEOUS at 02:47

## 2023-09-29 RX ADMIN — DOCUSATE SODIUM 100 MG: 100 CAPSULE, LIQUID FILLED ORAL at 06:00

## 2023-09-29 RX ADMIN — HYDROMORPHONE HYDROCHLORIDE 0.5 MG: 1 INJECTION, SOLUTION INTRAMUSCULAR; INTRAVENOUS; SUBCUTANEOUS at 16:05

## 2023-09-29 RX ADMIN — HYDROMORPHONE HYDROCHLORIDE 0.5 MG: 1 INJECTION, SOLUTION INTRAMUSCULAR; INTRAVENOUS; SUBCUTANEOUS at 21:13

## 2023-09-29 RX ADMIN — METHOCARBAMOL 750 MG: 750 TABLET ORAL at 14:38

## 2023-09-29 RX ADMIN — ANTACID TABLETS 500 MG: 500 TABLET, CHEWABLE ORAL at 06:03

## 2023-09-29 RX ADMIN — ENOXAPARIN SODIUM 40 MG: 100 INJECTION SUBCUTANEOUS at 06:04

## 2023-09-29 RX ADMIN — HYDROCODONE BITARTRATE AND ACETAMINOPHEN 1 TABLET: 10; 325 TABLET ORAL at 06:08

## 2023-09-29 RX ADMIN — HYDROCODONE BITARTRATE AND ACETAMINOPHEN 1 TABLET: 10; 325 TABLET ORAL at 18:51

## 2023-09-29 RX ADMIN — HYDROCODONE BITARTRATE AND ACETAMINOPHEN 1 TABLET: 10; 325 TABLET ORAL at 11:57

## 2023-09-29 RX ADMIN — METHOCARBAMOL 750 MG: 750 TABLET ORAL at 23:11

## 2023-09-29 RX ADMIN — DOCUSATE SODIUM 100 MG: 100 CAPSULE, LIQUID FILLED ORAL at 17:40

## 2023-09-29 RX ADMIN — DIAZEPAM 5 MG: 5 TABLET ORAL at 22:23

## 2023-09-29 RX ADMIN — ANTACID TABLETS 500 MG: 500 TABLET, CHEWABLE ORAL at 17:41

## 2023-09-29 RX ADMIN — BISACODYL 10 MG: 10 SUPPOSITORY RECTAL at 16:25

## 2023-09-29 RX ADMIN — MAGNESIUM HYDROXIDE 30 ML: 1200 LIQUID ORAL at 13:40

## 2023-09-29 ASSESSMENT — PAIN DESCRIPTION - PAIN TYPE
TYPE: SURGICAL PAIN
TYPE: ACUTE PAIN
TYPE: SURGICAL PAIN
TYPE: ACUTE PAIN
TYPE: SURGICAL PAIN
TYPE: SURGICAL PAIN
TYPE: ACUTE PAIN
TYPE: SURGICAL PAIN

## 2023-09-29 NOTE — DISCHARGE PLANNING
Care Transition Team Assessment    Information Source  Orientation Level: Oriented X4  Information Given By: Patient  Informant's Name: Rowan Rockwell  Who is responsible for making decisions for patient? : Patient    Readmission Evaluation  Is this a readmission?: No    Elopement Risk  Legal Hold: No  Ambulatory or Self Mobile in Wheelchair: Yes  Disoriented: No  Psychiatric Symptoms: None  History of Wandering: No  Elopement this Admit: No  Vocalizing Wanting to Leave: No  Displays Behaviors, Body Language Wanting to Leave: No-Not at Risk for Elopement  Elopement Risk: Not at Risk for Elopement    Interdisciplinary Discharge Planning  Does Admitting Nurse Feel This Could be a Complex Discharge?: No  Primary Care Physician: JAMES DENNIS  Lives with - Patient's Self Care Capacity: Spouse  Patient or legal guardian wants to designate a caregiver: No  Support Systems: Spouse / Significant Other  Housing / Facility: [address] (1 step to the front door.)  Do You Take your Prescribed Medications Regularly: No  Mobility Issues: No  Prior Services: None  Patient Prefers to be Discharged to:: home  Assistance Needed: No  Durable Medical Equipment: Not Applicable    Discharge Preparedness  What is your plan after discharge?: Home with help  What are your discharge supports?: Spouse  Prior Functional Level: Independent with Activities of Daily Living  Difficulity with ADLs: None    Functional Assesment  Prior Functional Level: Independent with Activities of Daily Living    Finances  Financial Barriers to Discharge: No  Prescription Coverage: No (Uses [facility] on  [address])    Vision / Hearing Impairment  Right Eye Vision: Impaired, Wears Glasses  Left Eye Vision: Impaired, Wears Glasses         Advance Directive  Advance Directive?: DPOA for Health Care (Per the patient her  Brice Rockwell is her Advanced Directive.)    Domestic Abuse  Physical Abuse or Sexual Abuse: No  Verbal Abuse or Emotional Abuse:  No    Psychological Assessment  History of Substance Abuse: None  History of Psychiatric Problems: No    Discharge Risks or Barriers  Discharge risks or barriers?: No    Anticipated Discharge Information  Discharge Disposition: Discharged to home/self care (01)  Discharge Address: [place] [address]  Discharge Contact Phone Number: [phone]      CLEMENTINE met with the patient. Her  and friend were at the bedside . CLEMENTINE requested permission to interview her in front of her visitors and per the patient, she stated that it was o.k.  Per the  he will transport the patient to home when discharged.   Patient is s/p 06/26/2023 THORACIC 2-5 LAMINECTOMY FOR RESECTION OF INTRADURAL TUMOR - Wound Class: Clean.  Per physical therapy, outpatient therapy was recommended.     CLEMENTINE reached out to admitting to update the patient's social security.   CLEMENTINE spoke with Korina and provided the correct social security number.

## 2023-09-29 NOTE — THERAPY
Physical Therapy   Initial Evaluation     Patient Name: Rowan Rockwell  Age:  42 y.o., Sex:  female  Medical Record #: [id number]  Today's Date: 9/28/2023     Precautions  Precautions: Spinal / Back Precautions     Assessment  Patient is 42 y.o. female who was recently admitted for resection of intradural tumor. Pt is now s/p T2-T5 laminectomy with resection on tumor. Pt is now off bed rest and has been able to mobilize with [facility] [place] prior to evaluation. Pt was agreeable to therapy evaluation and demonstrated with safe upright mobility with no AD use. Pt demonstrated with some guarding during ambulation, however, with VC and instruction to improve uzair and stride length pt was able to demonstrate with reciprocal gait pattern with less guarding. Pt was provided with education on cervical/thoracic spinal precautions and was provided with a handout. Pt was able to maintain precautions throughout session. Pt was provided with education log rolling and was able to verbalize correct mechanics as she was able to perform with OT this AM. Pt is in no acute skilled PT needs at this time, anticipate pt to d/c home with spouse support and recs for OP therapy services once cleared by surgeon.     Plan    Physical Therapy Initial Treatment Plan   Duration: Evaluation only    DC Equipment Recommendations: None  Discharge Recommendations: Recommend outpatient physical therapy services to address higher level deficits    Objective       09/28/23 9085   Initial Contact Note    Initial Contact Note Order Received and Verified, Physical Therapy Evaluation in Progress with Full Report to Follow.   Precautions   Precautions Spinal / Back Precautions    Pain 0 - 10 Group   Location Back;Head   Location Orientation Mid   Description Aching;Sore   Therapist Pain Assessment Prior to Activity;During Activity;Post Activity;Nurse Notified  (c/o minimal pain, not rated)   Prior Living Situation   Prior Services None   Housing /  Facility [facility]   Steps Into Home 0   Steps In Home 0   Bathroom Set up Bathtub / Shower Combination   Equipment Owned None   Lives with - Patient's Self Care Capacity Spouse   Comments Pt states spouse will be able to assist upon d/c to home   Prior Level of Functional Mobility   Bed Mobility Independent   Transfer Status Independent   Ambulation Independent   Ambulation Distance   (community)   Assistive Devices Used None   Stairs Independent   Comments pt reports of an IPLOF   History of Falls   History of Falls No   Cognition    Cognition / Consciousness WDL   Level of Consciousness Alert   Strength Upper Body   Upper Body Strength  WDL   Sensation Upper Body   Upper Extremity Sensation  WDL   Upper Body Muscle Tone   Upper Body Muscle Tone  WDL   Passive ROM Lower Body   Passive ROM Lower Body WDL   Active ROM Lower Body    Active ROM Lower Body  WDL   Strength Lower Body   Lower Body Strength  WDL   Sensation Lower Body   Lower Extremity Sensation   WDL   Lower Body Muscle Tone   Lower Body Muscle Tone  WDL   Neurological Concerns   Neurological Concerns No   Coordination Upper Body   Coordination WDL   Coordination Lower Body    Coordination Lower Body  WDL   Balance Assessment   Sitting Balance (Static) Good   Sitting Balance (Dynamic) Fair +   Standing Balance (Static) Fair +   Standing Balance (Dynamic) Fair +   Weight Shift Sitting Good   Weight Shift Standing Fair   Comments no AD use   Bed Mobility    Comments found up in chair, able to verbalize log roll mechanics   Gait Analysis   Gait Level Of Assist Standby Assist   Assistive Device None   Distance (Feet) 150   # of Times Distance was Traveled 1   Deviation Other (Comment)  (dec uzair)   Weight Bearing Status fwb   Functional Mobility   Sit to Stand Standby Assist   Bed, Chair, Wheelchair Transfer Standby Assist   Transfer Method Stand Step   Mobility no AD use, no adverse events noted or LOB   How much difficulty does the patient currently  have...   Turning over in bed (including adjusting bedclothes, sheets and blankets)? 4   Sitting down on and standing up from a chair with arms (e.g., wheelchair, bedside commode, etc.) 4   Moving from lying on back to sitting on the side of the bed? 4   How much help from another person does the patient currently need...   Moving to and from a bed to a chair (including a wheelchair)? 4   Need to walk in a hospital room? 4   Climbing 3-5 steps with a railing? 3   6 clicks Mobility Score 23   Activity Tolerance   Sitting in Chair functional   Sitting Edge of Bed NT   Standing 10 mins   Comments no adverse events noted   Edema / Skin Assessment   Edema / Skin  Not Assessed   Education Group   Education Provided Role of Physical Therapist;Cervical Precautions   Cervical Precautions Patient Response Patient;Acceptance;Explanation;Demonstration;Handout;Verbal Demonstration;Action Demonstration   Role of Physical Therapist Patient Response Patient;Acceptance;Explanation;Demonstration;Verbal Demonstration;Action Demonstration   Physical Therapy Initial Treatment Plan    Duration Evaluation only   Anticipated Discharge Equipment and Recommendations   DC Equipment Recommendations None   Discharge Recommendations Recommend outpatient physical therapy services to address higher level deficits   Interdisciplinary Plan of Care Collaboration   IDT Collaboration with  Nursing   Patient Position at End of Therapy Seated;Call Light within Reach;Tray Table within Reach;Phone within Reach;Family / Friend in Room   Collaboration Comments aware of visit and recs   Session Information   Date / Session Number  9/28 eval only   Priority 0

## 2023-09-29 NOTE — PROGRESS NOTES
Neurosurgery Progress Note    Subjective:  POD#3 T2-5 laminectomies with intradural tumor resection.  Doing ok, reports mild HA that improves with medications.  States that pre-operative paresthesia is largely similar to prior to surgery, but has noticed some improvement in LE paresthesia.  Has been getting some left thoracic paresthesia approximately at the T6 level.   Pathology shows schwannoma.  MRI pending today.   No BM for several days.    Drain out.      Exam:  Motor 5/5.  Dressing c/d/I..     BP  Min: 119/84  Max: 146/90  Pulse  Av  Min: 86  Max: 101  Resp  Av.5  Min: 16  Max: 18  Temp  Av.1 °C (98.8 °F)  Min: 37 °C (98.6 °F)  Max: 37.4 °C (99.3 °F)  SpO2  Av %  Min: 94 %  Max: 94 %    No data recorded                      Intake/Output                         23 0700 - 23 0659 23 0700 - 23 0659     7912-5103 8199-4810 Total 0897-8338 0300-4698 Total                 Intake    P.O.  780  -- 780  --  -- --    P.O. 780 -- 780 -- -- --    Total Intake 780 -- 780 -- -- --       Output    Urine  1000  -- 1000  --  -- --    Number of Times Voided 1 x 1 x 2 x -- -- --    Output (mL) ([REMOVED] Urethral Catheter Non-latex;Temperature probe 16 Fr. 23 1210) 1000 -- 1000 -- -- --    Drains  15  -- 15  --  -- --    Output (mL) ([REMOVED] Closed/Suction Drain 1 Superior Back Hemovac 10 Fr. 23 1210) 15 -- 15 -- -- --    Total Output 1015 -- 1015 -- -- --       Net I/O     -235 -- -235 -- -- --              Intake/Output Summary (Last 24 hours) at 2023 1232  Last data filed at 2023 1800  Gross per 24 hour   Intake 780 ml   Output --   Net 780 ml               diazePAM  5 mg Once    bethanechol  10 mg TID    Pharmacy Consult Request  1 Each PHARMACY TO DOSE    MD ALERT...DO NOT ADMINISTER NSAIDS or ASPIRIN unless ORDERED By Neurosurgery  1 Each PRN    docusate sodium  100 mg BID    senna-docusate  1 Tablet Nightly    senna-docusate  1 Tablet Q24HRS PRN     polyethylene glycol/lytes  1 Packet BID PRN    magnesium hydroxide  30 mL QDAY PRN    bisacodyl  10 mg Q24HRS PRN    sodium phosphate  1 Each Once PRN    LR   Continuous    enoxaparin (LOVENOX) injection  40 mg Daily-0600    acetaminophen  500 mg Q6HRS PRN    HYDROcodone/acetaminophen  1 Tablet Q4HRS PRN    oxyCODONE-acetaminophen  1 Tablet Q4HRS PRN    HYDROmorphone  0.5 mg Q3HRS PRN    diphenhydrAMINE  25 mg Q6HRS PRN    Or    diphenhydrAMINE  25 mg Q6HRS PRN    ondansetron  4 mg Q4HRS PRN    ondansetron  4 mg Q4HRS PRN    promethazine  12.5-25 mg Q4HRS PRN    promethazine  12.5-25 mg Q4HRS PRN    prochlorperazine  5-10 mg Q4HRS PRN    methocarbamol  750 mg Q8HRS PRN    labetalol  10 mg Q HOUR PRN    benzocaine-menthol  1 Lozenge Q2HRS PRN    calcium carbonate  500 mg BID       Assessment and Plan:  Hospital day # 4  POD# 3  PT/OT/ambulate as tolerated now.   MRI today.  Bowel regimen today.  Plan home tomorrow after BM and MRI completed.    MRI thoracic spine with and without contrast scheduled for today.    Chemical prophylactic DVT therapy: Yes - Lovenox 40mg/qd    Start date/time: today.

## 2023-09-29 NOTE — CARE PLAN
The patient is Stable - Low risk of patient condition declining or worsening    Shift Goals  Clinical Goals: pain management, voiding, MRI  Patient Goals: pain control, rest  Family Goals: n/a    Progress made toward(s) clinical / shift goals:    Problem: Pain - Standard  Goal: Alleviation of pain or a reduction in pain to the patient’s comfort goal  Outcome: Progressing     Problem: Urinary Elimination  Goal: Establish and maintain regular urinary output  Outcome: Progressing     Problem: Mobility  Goal: Patient's capacity to carry out activities will improve  Outcome: Progressing     Problem: Infection - Standard  Goal: Patient will remain free from infection  Outcome: Progressing     Problem: Wound/ / Incision Healing  Goal: Patient's wound/surgical incision will decrease in size and heals properly  Outcome: Progressing

## 2023-09-30 ENCOUNTER — PHARMACY VISIT (OUTPATIENT)
Dept: PHARMACY | Facility: MEDICAL CENTER | Age: 43
End: 2023-09-30
Payer: COMMERCIAL

## 2023-09-30 VITALS
HEIGHT: 60 IN | HEART RATE: 78 BPM | SYSTOLIC BLOOD PRESSURE: 142 MMHG | TEMPERATURE: 98.2 F | RESPIRATION RATE: 16 BRPM | OXYGEN SATURATION: 95 % | BODY MASS INDEX: 23.81 KG/M2 | DIASTOLIC BLOOD PRESSURE: 90 MMHG | WEIGHT: 121.25 LBS

## 2023-09-30 PROCEDURE — A9270 NON-COVERED ITEM OR SERVICE: HCPCS | Performed by: STUDENT IN AN ORGANIZED HEALTH CARE EDUCATION/TRAINING PROGRAM

## 2023-09-30 PROCEDURE — RXMED WILLOW AMBULATORY MEDICATION CHARGE: Performed by: PHYSICIAN ASSISTANT

## 2023-09-30 PROCEDURE — 700102 HCHG RX REV CODE 250 W/ 637 OVERRIDE(OP): Performed by: STUDENT IN AN ORGANIZED HEALTH CARE EDUCATION/TRAINING PROGRAM

## 2023-09-30 RX ORDER — HYDROCODONE BITARTRATE AND ACETAMINOPHEN 7.5; 325 MG/1; MG/1
1 TABLET ORAL EVERY 4 HOURS PRN
Qty: 42 TABLET | Refills: 0 | Status: SHIPPED | OUTPATIENT
Start: 2023-09-30 | End: 2023-10-07

## 2023-09-30 RX ORDER — METHOCARBAMOL 750 MG/1
750 TABLET, FILM COATED ORAL EVERY 8 HOURS PRN
Qty: 90 TABLET | Refills: 0 | Status: SHIPPED | OUTPATIENT
Start: 2023-09-30 | End: 2023-10-30

## 2023-09-30 RX ADMIN — HYDROCODONE BITARTRATE AND ACETAMINOPHEN 1 TABLET: 10; 325 TABLET ORAL at 08:16

## 2023-09-30 RX ADMIN — ANTACID TABLETS 500 MG: 500 TABLET, CHEWABLE ORAL at 04:05

## 2023-09-30 RX ADMIN — DOCUSATE SODIUM 100 MG: 100 CAPSULE, LIQUID FILLED ORAL at 04:05

## 2023-09-30 RX ADMIN — HYDROCODONE BITARTRATE AND ACETAMINOPHEN 1 TABLET: 10; 325 TABLET ORAL at 00:06

## 2023-09-30 RX ADMIN — HYDROCODONE BITARTRATE AND ACETAMINOPHEN 1 TABLET: 10; 325 TABLET ORAL at 12:20

## 2023-09-30 RX ADMIN — HYDROCODONE BITARTRATE AND ACETAMINOPHEN 1 TABLET: 10; 325 TABLET ORAL at 04:05

## 2023-09-30 RX ADMIN — METHOCARBAMOL 750 MG: 750 TABLET ORAL at 12:20

## 2023-09-30 ASSESSMENT — PAIN DESCRIPTION - PAIN TYPE
TYPE: SURGICAL PAIN
TYPE: SURGICAL PAIN
TYPE: ACUTE PAIN

## 2023-09-30 NOTE — CARE PLAN
The patient is Stable - Low risk of patient condition declining or worsening    Shift Goals  Clinical Goals: pain management  Patient Goals: pain control  Family Goals: n/a    Progress made toward(s) clinical / shift goals:      Problem: Urinary Elimination  Goal: Establish and maintain regular urinary output  Outcome: Progressing     Problem: Mobility  Goal: Patient's capacity to carry out activities will improve  Outcome: Progressing     Problem: Wound/ / Incision Healing  Goal: Patient's wound/surgical incision will decrease in size and heals properly  Outcome: Progressing       Patient is not progressing towards the following goals:

## 2023-09-30 NOTE — PROGRESS NOTES
0700 Patient's in bed. Bedside report received from  WM Spencer RN's at the beginning of the shift.    0816 Patient's sitting up in bed. Educated on the importance/use of IS at least 10x every hour while awake, able to reach 1000. Medicated with Norco (see MAR) for c/o's upper back and left shoulder pain, rates pain 6/10. Ice pack given. Assessment completed. No distress noted. Plan of care reviewed with the patient. Verbalized understanding.    0930 Patient's in bed. No distress noted.    1023 VILLA Welch visited. POC discussed with the patient. New order received for the patient to be discharged.    1150 Patient's in bed. No distress noted.     1220 Medicated with Norco (see MAR) for c/o's back and left shoulder pain, rates pain 6/10, ice packs given and Robaxin (see MAR) for c/o's muscle spasms.     1310 Patient was sent with patient's belongings accompanied by one female Care Aid and spouse to [facility]charge [facility].

## 2023-09-30 NOTE — DISCHARGE INSTRUCTIONS
Diet    Resume your normal diet as tolerated.  A diet low in cholesterol, fat, and sodium is recommended for good health.     Follow up with Dr Santiago, call for appointment.

## 2023-09-30 NOTE — DISCHARGE SUMMARY
DATE OF ADMISSION:  09/26/2023   DATE OF DISCHARGE:  09/30/2023     DISCHARGE DIAGNOSES:  1.  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  2.  Thoracic myelopathy.  3.  Severe spinal cord compression.     PROCEDURE PERFORMED:  T2-L5 laminectomy with intradural tumor resection.     COMPLICATIONS:  None.     REASON FOR ADMISSION:  This is a very pleasant 42-year-old female with the   above diagnoses.  She understood risks versus benefits and treatment   alternatives. She elected to proceed with the procedure.     HOSPITAL COURSE:  The patient was admitted on the day of surgery.  She   underwent surgery without complication.  By postoperative day #4, she is   ambulating independently.  Pain is well controlled on oral pain meds.  At this   point, she is now cleared for discharge home after uneventful hospital stay.     DISCHARGE INSTRUCTIONS:  The patient is to ambulate as tolerated.  She is to   avoid pushing, pulling, or lifting greater than 15 pounds.  She is to ice and   walk regularly.  It is okay for her to shower.  She is to not submerge the   wound.  It will be okay for her to drive when she is comfortable not taking   oral pain meds during the day.  She has a postoperative appointment in 2 weeks   at [facility].  She will contact our office with any questions or   concerns.     DISCHARGE MEDICATIONS:  1.  Norco 7-325, #42.  2.  Methocarbamol 750 mg, #90.     All the patient's questions have been answered.  She is discharged home in   stable condition.        ______________________________  Tino Balderas PAC        ______________________________  MD LAURENCE Melvin/GET    DD:  09/30/2023 10:26  DT:  09/30/2023 11:21    Job#:  [id number]

## 2023-09-30 NOTE — CARE PLAN
The patient is Stable - Low risk of patient condition declining or worsening    Shift Goals  Clinical Goals: pain control, comfort  Patient Goals: pain control, comfort, discharge  Family Goals: pain control, comfort, discharge    Progress made toward(s) clinical / shift goals:      Problem: Pain - Standard  Goal: Alleviation of pain or a reduction in pain to the patient’s comfort goal  Outcome: Progressing    Educated on pain scale. Encouraged to verbalize pain. Will medicate as per MAR.    Problem: Knowledge Deficit - Standard  Goal: Patient and family/care givers will demonstrate understanding of plan of care, disease process/condition, diagnostic tests and medications  Outcome: Progressing  Note: POC discussed with the patient and spouse. Discharge instructions given by Discharge RN at Discharge Lounge. Questions answered. Verbalized understanding.     Patient is not progressing towards the following goals:

## 2023-09-30 NOTE — PROGRESS NOTES
D/c instructions given, educated on worsening s/s. Pt understands and questions answered. D/c home with .

## 2023-09-30 NOTE — PROGRESS NOTES
Neurosurgery Progress Note    Subjective:  POD#4 T2-5 laminectomies with intradural tumor resection.  Doing ok, reports mild HA that improves with medications.  States that pre-operative paresthesia is largely similar to prior to surgery, but has noticed some improvement in LE paresthesia.  Has been getting some left thoracic paresthesia approximately at the T6 level.   Pathology shows schwannoma.  MRI pending today.   No BM for several days.    Drain out.      Exam:  Motor 5/5.  Dressing c/d/I..     BP  Min: 121/85  Max: 142/90  Pulse  Av  Min: 78  Max: 98  Resp  Av.5  Min: 16  Max: 17  Temp  Av.9 °C (98.5 °F)  Min: 36.8 °C (98.2 °F)  Max: 37.2 °C (99 °F)  SpO2  Av.8 %  Min: 93 %  Max: 97 %    No data recorded                      Intake/Output                         23 0700 - 23 0659 23 0700 - 10/01/23 0659     [id number] 2121-7229 Total [id number] [id number] Total                 Intake    P.O.  1300  -- 1300  --  -- --    P.O. 1300 -- 1300 -- -- --    Total Intake 1300 -- 1300 -- -- --       Output    Urine  350  -- 350  --  -- --    Number of Times Voided 3 x -- 3 x -- -- --    Urine Void (mL) 350 -- 350 -- -- --    Stool  --  -- --  --  -- --    Number of Times Stooled 1 x -- 1 x -- -- --    Total Output 350 -- 350 -- -- --       Net I/O     950 -- 950 -- -- --              Intake/Output Summary (Last 24 hours) at 2023 1019  Last data filed at 2023 1830  Gross per 24 hour   Intake 900 ml   Output 350 ml   Net 550 ml               bethanechol  10 mg TID    Pharmacy Consult Request  1 Each PHARMACY TO DOSE    MD ALERT...DO NOT ADMINISTER NSAIDS or ASPIRIN unless ORDERED By Neurosurgery  1 Each PRN    docusate sodium  100 mg BID    senna-docusate  1 Tablet Nightly    senna-docusate  1 Tablet Q24HRS PRN    polyethylene glycol/lytes  1 Packet BID PRN    magnesium hydroxide  30 mL QDAY PRN    bisacodyl  10 mg Q24HRS PRN    sodium phosphate  1 Each Once PRN    LR    Continuous    enoxaparin (LOVENOX) injection  40 mg Daily-0600    acetaminophen  500 mg Q6HRS PRN    HYDROcodone/acetaminophen  1 Tablet Q4HRS PRN    oxyCODONE-acetaminophen  1 Tablet Q4HRS PRN    HYDROmorphone  0.5 mg Q3HRS PRN    diphenhydrAMINE  25 mg Q6HRS PRN    Or    diphenhydrAMINE  25 mg Q6HRS PRN    ondansetron  4 mg Q4HRS PRN    ondansetron  4 mg Q4HRS PRN    promethazine  12.5-25 mg Q4HRS PRN    promethazine  12.5-25 mg Q4HRS PRN    prochlorperazine  5-10 mg Q4HRS PRN    methocarbamol  750 mg Q8HRS PRN    labetalol  10 mg Q HOUR PRN    benzocaine-menthol  1 Lozenge Q2HRS PRN    calcium carbonate  500 mg BID       Assessment and Plan:  Hospital day # 5  POD# 4  PT/OT/ambulate as tolerated now.   MRI reviewed.  Bowel regimen today.  Plan home today    Chemical prophylactic DVT therapy: Yes - Lovenox 40mg/qd    Start date/time: today.

## 2023-09-30 NOTE — PROGRESS NOTES
2224 Patient taken to MRI via transport in wheelchair.     2305 Patient returned to unit via transport in wheelchair.

## 2023-09-30 NOTE — CARE PLAN
The patient is Stable - Low risk of patient condition declining or worsening    Shift Goals  Clinical Goals: pain  Patient Goals: pain  Family Goals: na    Progress made toward(s) clinical / shift goals:  yes    Problem: Pain - Standard  Goal: Alleviation of pain or a reduction in pain to the patient’s comfort goal  Outcome: Progressing     Problem: Urinary Elimination  Goal: Establish and maintain regular urinary output  Outcome: Progressing     Problem: Mobility  Goal: Patient's capacity to carry out activities will improve  Outcome: Progressing     Problem: Infection - Standard  Goal: Patient will remain free from infection  Outcome: Progressing     Problem: Wound/ / Incision Healing  Goal: Patient's wound/surgical incision will decrease in size and heals properly  Outcome: Progressing       Patient is not progressing towards the following goals:

## 2024-09-17 ENCOUNTER — HOSPITAL ENCOUNTER (OUTPATIENT)
Dept: LAB | Facility: MEDICAL CENTER | Age: 44
End: 2024-09-17
Attending: FAMILY MEDICINE
Payer: MEDICAID

## 2024-09-17 LAB
BASOPHILS # BLD AUTO: 1.3 % (ref 0–1.8)
BASOPHILS # BLD: 0.06 K/UL (ref 0–0.12)
EOSINOPHIL # BLD AUTO: 0.04 K/UL (ref 0–0.51)
EOSINOPHIL NFR BLD: 0.8 % (ref 0–6.9)
ERYTHROCYTE [DISTWIDTH] IN BLOOD BY AUTOMATED COUNT: 42.9 FL (ref 35.9–50)
EST. AVERAGE GLUCOSE BLD GHB EST-MCNC: 108 MG/DL
HBA1C MFR BLD: 5.4 % (ref 4–5.6)
HCT VFR BLD AUTO: 43 % (ref 37–47)
HGB BLD-MCNC: 13.8 G/DL (ref 12–16)
IMM GRANULOCYTES # BLD AUTO: 0 K/UL (ref 0–0.11)
IMM GRANULOCYTES NFR BLD AUTO: 0 % (ref 0–0.9)
LYMPHOCYTES # BLD AUTO: 1.75 K/UL (ref 1–4.8)
LYMPHOCYTES NFR BLD: 36.5 % (ref 22–41)
MCH RBC QN AUTO: 28.5 PG (ref 27–33)
MCHC RBC AUTO-ENTMCNC: 32.1 G/DL (ref 32.2–35.5)
MCV RBC AUTO: 88.7 FL (ref 81.4–97.8)
MONOCYTES # BLD AUTO: 0.39 K/UL (ref 0–0.85)
MONOCYTES NFR BLD AUTO: 8.1 % (ref 0–13.4)
NEUTROPHILS # BLD AUTO: 2.56 K/UL (ref 1.82–7.42)
NEUTROPHILS NFR BLD: 53.3 % (ref 44–72)
NRBC # BLD AUTO: 0 K/UL
NRBC BLD-RTO: 0 /100 WBC (ref 0–0.2)
PLATELET # BLD AUTO: 334 K/UL (ref 164–446)
PMV BLD AUTO: 10 FL (ref 9–12.9)
RBC # BLD AUTO: 4.85 M/UL (ref 4.2–5.4)
WBC # BLD AUTO: 4.8 K/UL (ref 4.8–10.8)

## 2024-09-17 PROCEDURE — 80061 LIPID PANEL: CPT

## 2024-09-17 PROCEDURE — 85025 COMPLETE CBC W/AUTO DIFF WBC: CPT

## 2024-09-17 PROCEDURE — 84439 ASSAY OF FREE THYROXINE: CPT

## 2024-09-17 PROCEDURE — 80053 COMPREHEN METABOLIC PANEL: CPT

## 2024-09-17 PROCEDURE — 82728 ASSAY OF FERRITIN: CPT

## 2024-09-17 PROCEDURE — 83540 ASSAY OF IRON: CPT

## 2024-09-17 PROCEDURE — 83550 IRON BINDING TEST: CPT

## 2024-09-17 PROCEDURE — 84443 ASSAY THYROID STIM HORMONE: CPT

## 2024-09-17 PROCEDURE — 36415 COLL VENOUS BLD VENIPUNCTURE: CPT

## 2024-09-17 PROCEDURE — 82306 VITAMIN D 25 HYDROXY: CPT

## 2024-09-17 PROCEDURE — 83036 HEMOGLOBIN GLYCOSYLATED A1C: CPT

## 2024-09-18 LAB
25(OH)D3 SERPL-MCNC: 25 NG/ML (ref 30–100)
ALBUMIN SERPL BCP-MCNC: 4.5 G/DL (ref 3.2–4.9)
ALBUMIN/GLOB SERPL: 1.5 G/DL
ALP SERPL-CCNC: 58 U/L (ref 30–99)
ALT SERPL-CCNC: 16 U/L (ref 2–50)
ANION GAP SERPL CALC-SCNC: 13 MMOL/L (ref 7–16)
AST SERPL-CCNC: 17 U/L (ref 12–45)
BILIRUB SERPL-MCNC: 0.8 MG/DL (ref 0.1–1.5)
BUN SERPL-MCNC: 14 MG/DL (ref 8–22)
CALCIUM ALBUM COR SERPL-MCNC: 9.1 MG/DL (ref 8.5–10.5)
CALCIUM SERPL-MCNC: 9.5 MG/DL (ref 8.5–10.5)
CHLORIDE SERPL-SCNC: 102 MMOL/L (ref 96–112)
CHOLEST SERPL-MCNC: 250 MG/DL (ref 100–199)
CO2 SERPL-SCNC: 23 MMOL/L (ref 20–33)
CREAT SERPL-MCNC: 0.74 MG/DL (ref 0.5–1.4)
FERRITIN SERPL-MCNC: 57.5 NG/ML (ref 10–291)
GFR SERPLBLD CREATININE-BSD FMLA CKD-EPI: 102 ML/MIN/1.73 M 2
GLOBULIN SER CALC-MCNC: 3 G/DL (ref 1.9–3.5)
GLUCOSE SERPL-MCNC: 87 MG/DL (ref 65–99)
HDLC SERPL-MCNC: 46 MG/DL
IRON SATN MFR SERPL: 33 % (ref 15–55)
IRON SERPL-MCNC: 105 UG/DL (ref 40–170)
LDLC SERPL CALC-MCNC: 179 MG/DL
POTASSIUM SERPL-SCNC: 4.2 MMOL/L (ref 3.6–5.5)
PROT SERPL-MCNC: 7.5 G/DL (ref 6–8.2)
SODIUM SERPL-SCNC: 138 MMOL/L (ref 135–145)
T4 FREE SERPL-MCNC: 1.2 NG/DL (ref 0.93–1.7)
TIBC SERPL-MCNC: 315 UG/DL (ref 250–450)
TRIGL SERPL-MCNC: 123 MG/DL (ref 0–149)
TSH SERPL-ACNC: 2.35 UIU/ML (ref 0.35–5.5)
UIBC SERPL-MCNC: 210 UG/DL (ref 110–370)

## (undated) DEVICE — SUTURE GENERAL

## (undated) DEVICE — PATTIES SURG X-RAYCOTTONOID - 1/2 X 3 IN (200/CA)

## (undated) DEVICE — GLOVE BIOGEL INDICATOR SZ 6.5 SURGICAL PF LTX - (50PR/BX 4BX/CA)

## (undated) DEVICE — SPONGE GAUZESTER. 2X2 4-PL - (2/PK 50PK/BX 30BX/CS)

## (undated) DEVICE — DRESSING TRANSPARENT FILM TEGADERM 2.375 X 2.75"  (100EA/BX)"

## (undated) DEVICE — DRAPE 36X28IN RAD CARM BND BG - (25/CA) O

## (undated) DEVICE — ELECTRODE DUAL RETURN W/ CORD - (50/PK)

## (undated) DEVICE — DRESSING NON-ADHERING 8 X 3 - (50/BX)

## (undated) DEVICE — SET EXTENSION WITH 2 PORTS (48EA/CA) ***PART #2C8610 IS A SUBSTITUTE*****

## (undated) DEVICE — GOWN WARMING STANDARD FLEX - (30/CA)

## (undated) DEVICE — TUBING CLEARLINK DUO-VENT - C-FLO (48EA/CA)

## (undated) DEVICE — GLOVE BIOGEL PI ORTHO SZ 7.5 PF LF (40PR/BX)

## (undated) DEVICE — GLOVE BIOGEL PI INDICATOR SZ 8.0 SURGICAL PF LF -(50/BX 4BX/CA)

## (undated) DEVICE — PEN SKIN MARKER W/RULER - (50EA/BX)

## (undated) DEVICE — TISSEEL 4ML ----MUST ORDER A MIN OF 6EA----

## (undated) DEVICE — INTRAOP NEURO IN OR 1:1 PER 15 MIN

## (undated) DEVICE — FORCEPS IRRIGATING 9 X 1.5MM (5EA/BX)

## (undated) DEVICE — DRESSING POST OP BORDER 4INX6IN AG (70/CA)

## (undated) DEVICE — PATTIES SURG NEURO X-RAY 1/2X1/2 (10EA/PK 20PK/CS)

## (undated) DEVICE — DRAPE STRLE REG TOWEL 18X24 - (10/BX 4BX/CA)"

## (undated) DEVICE — GLOVE BIOGEL SZ 6.5 SURGICAL PF LTX (50PR/BX 4BX/CA)

## (undated) DEVICE — GOWN SURGEONS X-LARGE - DISP. (30/CA)

## (undated) DEVICE — HEADREST PRONEVIEW LARGE - (10/CA)

## (undated) DEVICE — DRESSING TRANSPARENT FILM TEGADERM 4 X 4.75" (50EA/BX)"

## (undated) DEVICE — BLADE SURGICAL CLIPPER - (50EA/CA)

## (undated) DEVICE — COVER MAYO STAND X-LG - (22EA/CA)

## (undated) DEVICE — MIDAS LUBRICATOR DIFFUSER PACK (4EA/CA)

## (undated) DEVICE — CATHETER IV 14 GA X 2 ---SURG.& SDS ONLY---(200EA/CA)

## (undated) DEVICE — CHLORAPREP 26 ML APPLICATOR - ORANGE TINT(25/CA)

## (undated) DEVICE — TRAY CATHETER FOLEY URINE METER W/STATLOCK 350ML (10EA/CA)

## (undated) DEVICE — LACTATED RINGERS INJ. 500 ML - (24EA/CA)

## (undated) DEVICE — DRAPE LAPAROTOMY T SHEET - (12EA/CA)

## (undated) DEVICE — SENSOR OXIMETER ADULT SPO2 RD SET (20EA/BX)

## (undated) DEVICE — COVER LIGHT HANDLE ALC PLUS DISP (18EA/BX)

## (undated) DEVICE — GLOVE SZ 8 BIOGEL PI MICRO - PF LF (50PR/BX)

## (undated) DEVICE — DRAIN J-VAC 7MM FLAT - (10EA/CA)

## (undated) DEVICE — PATTIES SURG NEURO X-RAY 3X3 - 10/PK 20PK/CA

## (undated) DEVICE — SLEEVE, VASO, THIGH, MED

## (undated) DEVICE — SUTURE 2-0 VICRYL PLUS CT-1 - 8 X 18 INCH(12/BX)

## (undated) DEVICE — SUTURE 4-0 NUROLON CR/8 TF - (12/BX) ETHICON

## (undated) DEVICE — LACTATED RINGERS INJ 1000 ML - (14EA/CA 60CA/PF)

## (undated) DEVICE — DRAPE MICROSCOPE ARMATEC 120IN X 46IN (10EA/CA)

## (undated) DEVICE — CANISTER SUCTION 3000ML MECHANICAL FILTER AUTO SHUTOFF MEDI-VAC NONSTERILE LF DISP  (40EA/CA)

## (undated) DEVICE — DRAPE LARGE 3 QUARTER - (20/CA)

## (undated) DEVICE — SET LEADWIRE 5 LEAD BEDSIDE DISPOSABLE ECG (1SET OF 5/EA)

## (undated) DEVICE — RESERVOIR SUCTION 100 CC - SILICONE (20EA/CA)

## (undated) DEVICE — SUTURE GOR-TEX CV-6 TT-9 (36PK/BX)

## (undated) DEVICE — HEMOSTAT SURG ABSORBABLE - 2 X 3 IN SURGICEL (24EA/CA)

## (undated) DEVICE — KIT SURGIFLO W/OUT THROMBIN - (6EA/CA)

## (undated) DEVICE — ARMREST CRADLE FOAM - (2PR/PK 12PR/CA)

## (undated) DEVICE — SUTURE 3-0 ETHILON FS-1 - (36/BX) 30 INCH

## (undated) DEVICE — SEALER BIPOLAR 2.3 AQUAMANTYS

## (undated) DEVICE — PACK NEURO - (2EA/CA)

## (undated) DEVICE — TOWEL STOP TIMEOUT SAFETY FLAG (40EA/CA)

## (undated) DEVICE — SODIUM CHL IRRIGATION 0.9% 1000ML (12EA/CA)

## (undated) DEVICE — GOWN SURGEONS LARGE - (32/CA)

## (undated) DEVICE — CORDS BIPOLAR COAGULATION - 12FT STERILE DISP. (10EA/BX)

## (undated) DEVICE — SUCTION INSTRUMENT YANKAUER BULBOUS TIP W/O VENT (50EA/CA)

## (undated) DEVICE — CLOSURE SKIN STRIP 1/2 X 4 IN - (STERI STRIP) (50/BX 4BX/CA)

## (undated) DEVICE — FORCEPS ELECTROSURGICAL DISPOSABLE CODMAN 9IN 1.5MM

## (undated) DEVICE — SUTURE 0 VICRYL PLUS CT-1 - 8 X 18 INCH (12/BX)

## (undated) DEVICE — TOWELS CLOTH SURGICAL - (4/PK 20PK/CA)

## (undated) DEVICE — TOOL MR8 14CM MATCH HD SYM-TRI 3MM DIAMETER (1/EA)

## (undated) DEVICE — DRESSING POST OP BORDER 4 X 10 (5EA/BX)